# Patient Record
Sex: MALE | Race: WHITE | NOT HISPANIC OR LATINO
[De-identification: names, ages, dates, MRNs, and addresses within clinical notes are randomized per-mention and may not be internally consistent; named-entity substitution may affect disease eponyms.]

---

## 2017-01-09 ENCOUNTER — APPOINTMENT (OUTPATIENT)
Dept: ORTHOPEDIC SURGERY | Facility: CLINIC | Age: 62
End: 2017-01-09

## 2017-01-09 VITALS — HEIGHT: 69 IN | BODY MASS INDEX: 31.1 KG/M2 | RESPIRATION RATE: 16 BRPM | WEIGHT: 210 LBS

## 2017-01-09 DIAGNOSIS — Z87.891 PERSONAL HISTORY OF NICOTINE DEPENDENCE: ICD-10-CM

## 2017-01-09 DIAGNOSIS — G20 PARKINSON'S DISEASE: ICD-10-CM

## 2017-01-09 DIAGNOSIS — S63.639D SPRAIN OF INTERPHALANGEAL JOINT OF UNSPECIFIED FINGER, SUBSEQUENT ENCOUNTER: ICD-10-CM

## 2017-01-09 PROBLEM — Z00.00 ENCOUNTER FOR PREVENTIVE HEALTH EXAMINATION: Status: ACTIVE | Noted: 2017-01-09

## 2017-01-09 RX ORDER — CARBIDOPA AND LEVODOPA 25; 250 MG/1; MG/1
25-250 TABLET ORAL
Refills: 0 | Status: ACTIVE | COMMUNITY

## 2017-01-09 RX ORDER — MIRTAZAPINE 15 MG/1
15 TABLET, FILM COATED ORAL
Refills: 0 | Status: ACTIVE | COMMUNITY

## 2017-01-09 RX ORDER — VENLAFAXINE HYDROCHLORIDE 150 MG/1
150 CAPSULE, EXTENDED RELEASE ORAL
Refills: 0 | Status: ACTIVE | COMMUNITY

## 2017-01-18 ENCOUNTER — OTHER (OUTPATIENT)
Age: 62
End: 2017-01-18

## 2017-01-18 RX ORDER — CEPHALEXIN 500 MG/1
500 CAPSULE ORAL EVERY 6 HOURS
Qty: 25 | Refills: 0 | Status: DISCONTINUED | COMMUNITY
Start: 2017-01-18 | End: 2017-01-18

## 2017-01-19 ENCOUNTER — OUTPATIENT (OUTPATIENT)
Dept: OUTPATIENT SERVICES | Facility: HOSPITAL | Age: 62
LOS: 1 days | Discharge: HOME | End: 2017-01-19
Payer: MEDICARE

## 2017-01-19 ENCOUNTER — APPOINTMENT (OUTPATIENT)
Dept: ORTHOPEDIC SURGERY | Facility: AMBULATORY SURGERY CENTER | Age: 62
End: 2017-01-19
Payer: MEDICARE

## 2017-01-19 PROCEDURE — 26615 TREAT METACARPAL FRACTURE: CPT | Mod: F9

## 2017-01-25 DIAGNOSIS — S62.356A NONDISPLACED FRACTURE OF SHAFT OF FIFTH METACARPAL BONE, RIGHT HAND, INITIAL ENCOUNTER FOR CLOSED FRACTURE: ICD-10-CM

## 2017-01-25 DIAGNOSIS — K21.9 GASTRO-ESOPHAGEAL REFLUX DISEASE WITHOUT ESOPHAGITIS: ICD-10-CM
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
## 2017-01-25 DIAGNOSIS — G20 PARKINSON'S DISEASE: ICD-10-CM

## 2017-01-27 ENCOUNTER — APPOINTMENT (OUTPATIENT)
Dept: ORTHOPEDIC SURGERY | Facility: CLINIC | Age: 62
End: 2017-01-27

## 2017-02-10 ENCOUNTER — APPOINTMENT (OUTPATIENT)
Dept: ORTHOPEDIC SURGERY | Facility: CLINIC | Age: 62
End: 2017-02-10

## 2017-02-10 RX ORDER — SELEGILINE HYDROCHLORIDE 5 MG/1
5 CAPSULE ORAL
Qty: 180 | Refills: 0 | Status: ACTIVE | COMMUNITY
Start: 2016-08-16

## 2017-02-10 RX ORDER — AMANTADINE HYDROCHLORIDE 100 1/1
100 TABLET ORAL
Qty: 90 | Refills: 0 | Status: ACTIVE | COMMUNITY
Start: 2017-01-20

## 2017-03-01 ENCOUNTER — APPOINTMENT (OUTPATIENT)
Dept: ORTHOPEDIC SURGERY | Facility: CLINIC | Age: 62
End: 2017-03-01

## 2017-03-01 DIAGNOSIS — S62.326K: ICD-10-CM

## 2019-02-04 ENCOUNTER — APPOINTMENT (OUTPATIENT)
Dept: ORTHOPEDIC SURGERY | Facility: CLINIC | Age: 64
End: 2019-02-04
Payer: MEDICARE

## 2019-02-04 VITALS — RESPIRATION RATE: 16 BRPM | WEIGHT: 210 LBS | HEIGHT: 69 IN | BODY MASS INDEX: 31.1 KG/M2

## 2019-02-04 DIAGNOSIS — M79.645 PAIN IN LEFT FINGER(S): ICD-10-CM

## 2019-02-04 DIAGNOSIS — M79.641 PAIN IN RIGHT HAND: ICD-10-CM

## 2019-02-04 PROCEDURE — 99214 OFFICE O/P EST MOD 30 MIN: CPT

## 2019-02-04 PROCEDURE — 73140 X-RAY EXAM OF FINGER(S): CPT | Mod: F4

## 2019-02-05 RX ORDER — OXYCODONE AND ACETAMINOPHEN 5; 325 MG/1; MG/1
5-325 TABLET ORAL
Qty: 40 | Refills: 0 | Status: DISCONTINUED | COMMUNITY
Start: 2017-01-18 | End: 2019-02-05

## 2019-02-05 RX ORDER — DONEPEZIL HYDROCHLORIDE 5 MG/1
5 TABLET ORAL
Qty: 90 | Refills: 0 | Status: ACTIVE | COMMUNITY
Start: 2019-01-25

## 2019-02-05 RX ORDER — CEPHALEXIN 500 MG/1
500 CAPSULE ORAL
Qty: 20 | Refills: 0 | Status: DISCONTINUED | COMMUNITY
Start: 2017-01-18 | End: 2019-02-05

## 2019-02-05 RX ORDER — OXYCODONE AND ACETAMINOPHEN 5; 325 MG/1; MG/1
5-325 TABLET ORAL
Qty: 30 | Refills: 0 | Status: ACTIVE | COMMUNITY
Start: 2019-02-05 | End: 1900-01-01

## 2019-02-06 ENCOUNTER — APPOINTMENT (OUTPATIENT)
Dept: ORTHOPEDIC SURGERY | Facility: AMBULATORY SURGERY CENTER | Age: 64
End: 2019-02-06
Payer: MEDICARE

## 2019-02-06 ENCOUNTER — OUTPATIENT (OUTPATIENT)
Dept: OUTPATIENT SERVICES | Facility: HOSPITAL | Age: 64
LOS: 1 days | Discharge: HOME | End: 2019-02-06

## 2019-02-06 PROCEDURE — 26727 TREAT FINGER FRACTURE EACH: CPT | Mod: F4

## 2019-02-15 ENCOUNTER — APPOINTMENT (OUTPATIENT)
Dept: ORTHOPEDIC SURGERY | Facility: CLINIC | Age: 64
End: 2019-02-15
Payer: MEDICARE

## 2019-02-15 PROCEDURE — 29075 APPL CST ELBW FNGR SHORT ARM: CPT | Mod: 58,LT

## 2019-02-15 PROCEDURE — 73140 X-RAY EXAM OF FINGER(S): CPT | Mod: F4

## 2019-02-15 PROCEDURE — 99024 POSTOP FOLLOW-UP VISIT: CPT

## 2019-02-15 RX ORDER — CEPHALEXIN 500 MG/1
500 CAPSULE ORAL 3 TIMES DAILY
Qty: 21 | Refills: 0 | Status: DISCONTINUED | COMMUNITY
Start: 2019-02-05 | End: 2019-02-15

## 2019-02-15 NOTE — HISTORY OF PRESENT ILLNESS
[___ Days Post Op] : post op day #[unfilled] [0] : no pain reported [Clean/Dry/Intact] : clean, dry and intact [Healed] : healed [Neuro Intact] : an unremarkable neurological exam [Vascular Intact] : ~T peripheral vascular exam normal [Doing Well] : is doing well [Excellent Pain Control] : has excellent pain control [No Sign of Infection] : is showing no signs of infection [Chills] : no chills [Constipation] : no constipation [Diarrhea] : no diarrhea [Dysuria] : no dysuria [Fever] : no fever [Nausea] : no nausea [Vomiting] : no vomiting [Erythema] : not erythematous [Discharge] : absent of discharge [Swelling] : not swollen [Dehiscence] : not dehisced [Sutures Removed] : sutures were not removed [Steri-Strips Removed & Replaced] : steri-strips not removed [de-identified] : DOS: 2/6/2019 [de-identified] : 9 days s/p CRIF Left V proximal phalanx fracture\par \par Patient got the dressings wet.\par \par Patient is tolerating the antibiotics well without side effects. [de-identified] : The pins are clean and dry with there is mild erythema around both pin sites. There is maintaining alignment and he is moving the digits well with good capillary refill negative Tinel's sensation is intact. [de-identified] : PA lateral oblique of the left fifth digit shows maintained alignment [de-identified] : 9 days s/p CRIF Left V proximal phalanx fracture with pin site irritation and possible early infection. [de-identified] : A short-arm cast was applied.\par \par The importance of compliance and not getting the cast wet with stress.\par \par Return to the office in one week weight-bearing as tolerated

## 2019-02-22 ENCOUNTER — APPOINTMENT (OUTPATIENT)
Dept: ORTHOPEDIC SURGERY | Facility: CLINIC | Age: 64
End: 2019-02-22
Payer: MEDICARE

## 2019-02-22 PROCEDURE — 29075 APPL CST ELBW FNGR SHORT ARM: CPT | Mod: 58,LT

## 2019-02-22 PROCEDURE — 73140 X-RAY EXAM OF FINGER(S): CPT | Mod: F4

## 2019-02-22 PROCEDURE — 99024 POSTOP FOLLOW-UP VISIT: CPT

## 2019-02-22 NOTE — HISTORY OF PRESENT ILLNESS
[___ Days Post Op] : post op day #[unfilled] [0] : no pain reported [Clean/Dry/Intact] : clean, dry and intact [Healed] : healed [Neuro Intact] : an unremarkable neurological exam [Vascular Intact] : ~T peripheral vascular exam normal [Doing Well] : is doing well [Excellent Pain Control] : has excellent pain control [No Sign of Infection] : is showing no signs of infection [Chills] : no chills [Constipation] : no constipation [Diarrhea] : no diarrhea [Dysuria] : no dysuria [Fever] : no fever [Nausea] : no nausea [Vomiting] : no vomiting [Erythema] : not erythematous [Discharge] : absent of discharge [Swelling] : not swollen [Dehiscence] : not dehisced [Sutures Removed] : sutures were not removed [Steri-Strips Removed & Replaced] : steri-strips not removed [de-identified] : DOS: 2/6/19 [de-identified] : 16 days s/p CRIF Left V proximal phalanx fracture\par \par Pt on Keflex\par \par  [de-identified] : Pin sites are clean and dry without evidence of infection. There is excellent little rotation of his wound the digits well. Good capillary refill negative Tinel's sensation grossly intact\par  [de-identified] : PA lateral and oblique of the left hand shows maintained alignment with hardware in place [de-identified] : 16 days s/p CRIF Left V proximal phalanx fracture [de-identified] : Patient will finish off his antibiotics and continue his home program\par \par Return to the office in 1-1/2 weeks

## 2019-03-05 ENCOUNTER — APPOINTMENT (OUTPATIENT)
Dept: ORTHOPEDIC SURGERY | Facility: CLINIC | Age: 64
End: 2019-03-05
Payer: MEDICARE

## 2019-03-05 PROCEDURE — 73140 X-RAY EXAM OF FINGER(S): CPT | Mod: F4

## 2019-03-05 PROCEDURE — 20670 REMOVAL IMPLANT SUPERFICIAL: CPT | Mod: 58,LT

## 2019-03-05 PROCEDURE — 99024 POSTOP FOLLOW-UP VISIT: CPT

## 2019-03-05 RX ORDER — CEPHALEXIN 500 MG/1
500 CAPSULE ORAL EVERY 6 HOURS
Qty: 40 | Refills: 0 | Status: ACTIVE | COMMUNITY
Start: 2019-02-15 | End: 1900-01-01

## 2019-03-11 ENCOUNTER — APPOINTMENT (OUTPATIENT)
Dept: ORTHOPEDIC SURGERY | Facility: CLINIC | Age: 64
End: 2019-03-11
Payer: MEDICARE

## 2019-03-11 PROCEDURE — 73140 X-RAY EXAM OF FINGER(S): CPT | Mod: F4

## 2019-03-11 PROCEDURE — 99024 POSTOP FOLLOW-UP VISIT: CPT

## 2019-03-11 NOTE — HISTORY OF PRESENT ILLNESS
[___ Weeks Post Op] : [unfilled] weeks post op [0] : no pain reported [Clean/Dry/Intact] : clean, dry and intact [Healed] : healed [Neuro Intact] : an unremarkable neurological exam [Vascular Intact] : ~T peripheral vascular exam normal [Doing Well] : is doing well [Excellent Pain Control] : has excellent pain control [No Sign of Infection] : is showing no signs of infection [Chills] : no chills [Constipation] : no constipation [Diarrhea] : no diarrhea [Dysuria] : no dysuria [Fever] : no fever [Nausea] : no nausea [Vomiting] : no vomiting [Erythema] : not erythematous [Discharge] : absent of discharge [Swelling] : not swollen [Dehiscence] : not dehisced [Sutures Removed] : sutures were not removed [Steri-Strips Removed & Replaced] : steri-strips not removed [de-identified] : DOS: 2/6/19 [de-identified] : 5 weeks s/p CRIF Left V proximal phalanx fracture with pin site infection\par \par Pt on Keflex [de-identified] : There is rheumatic reduction of swelling no tenderness to fracture site. The pins sites have re re epithelialize without evidence of infection.\par \par Range of motion is 0/90 of the MP joint 30/80 the PIP joint passively correctable to 0° and 0/60 the DIP joint\par There is no tenderness at the fracture site with good rotation and no instability of the MP PIP and DIP joints bilaterally\par There is good capillary refill of the digits bilaterally.There is no masses or sensitivity over the median and ulnar nerves at the level of the wrist. There is a negative Tinel's and negative Phalen's sign bilaterally. The sensation is grossly intact bilaterally. [de-identified] : 5 weeks s/p CRIF Left V proximal phalanx fracture with pin site infection [de-identified] : Patient will finish his oral antibiotics and begin occupational therapy\par \par Return to the office in one month

## 2019-07-09 ENCOUNTER — APPOINTMENT (OUTPATIENT)
Dept: ORTHOPEDIC SURGERY | Facility: CLINIC | Age: 64
End: 2019-07-09
Payer: MEDICARE

## 2019-07-09 DIAGNOSIS — S62.617P DISPLACED FRACTURE OF PROXIMAL PHALANX OF LEFT LITTLE FINGER, SUBSEQUENT ENCOUNTER FOR FRACTURE WITH MALUNION: ICD-10-CM

## 2019-07-09 PROCEDURE — 73140 X-RAY EXAM OF FINGER(S): CPT | Mod: F4

## 2019-07-09 PROCEDURE — 99214 OFFICE O/P EST MOD 30 MIN: CPT

## 2024-01-01 ENCOUNTER — INPATIENT (INPATIENT)
Facility: HOSPITAL | Age: 69
LOS: 2 days | DRG: 951 | End: 2024-01-27
Attending: INTERNAL MEDICINE | Admitting: INTERNAL MEDICINE
Payer: OTHER MISCELLANEOUS

## 2024-01-01 ENCOUNTER — TRANSCRIPTION ENCOUNTER (OUTPATIENT)
Age: 69
End: 2024-01-01

## 2024-01-01 ENCOUNTER — INPATIENT (INPATIENT)
Facility: HOSPITAL | Age: 69
LOS: 0 days | Discharge: HOSPICE | DRG: 871 | End: 2024-01-24
Attending: STUDENT IN AN ORGANIZED HEALTH CARE EDUCATION/TRAINING PROGRAM | Admitting: INTERNAL MEDICINE
Payer: MEDICARE

## 2024-01-01 VITALS
HEART RATE: 113 BPM | DIASTOLIC BLOOD PRESSURE: 53 MMHG | SYSTOLIC BLOOD PRESSURE: 85 MMHG | OXYGEN SATURATION: 86 % | RESPIRATION RATE: 30 BRPM

## 2024-01-01 VITALS
DIASTOLIC BLOOD PRESSURE: 54 MMHG | OXYGEN SATURATION: 86 % | TEMPERATURE: 99 F | SYSTOLIC BLOOD PRESSURE: 90 MMHG | HEART RATE: 100 BPM

## 2024-01-01 VITALS
TEMPERATURE: 102 F | RESPIRATION RATE: 8 BRPM | OXYGEN SATURATION: 53 % | SYSTOLIC BLOOD PRESSURE: 52 MMHG | DIASTOLIC BLOOD PRESSURE: 30 MMHG | HEART RATE: 36 BPM

## 2024-01-01 VITALS
OXYGEN SATURATION: 84 % | DIASTOLIC BLOOD PRESSURE: 53 MMHG | SYSTOLIC BLOOD PRESSURE: 82 MMHG | HEART RATE: 92 BPM | RESPIRATION RATE: 19 BRPM | TEMPERATURE: 100 F

## 2024-01-01 DIAGNOSIS — G20.A1 PARKINSON'S DISEASE WITHOUT DYSKINESIA, WITHOUT MENTION OF FLUCTUATIONS: ICD-10-CM

## 2024-01-01 DIAGNOSIS — Z51.5 ENCOUNTER FOR PALLIATIVE CARE: ICD-10-CM

## 2024-01-01 DIAGNOSIS — J96.01 ACUTE RESPIRATORY FAILURE WITH HYPOXIA: ICD-10-CM

## 2024-01-01 DIAGNOSIS — T83.9XXA UNSPECIFIED COMPLICATION OF GENITOURINARY PROSTHETIC DEVICE, IMPLANT AND GRAFT, INITIAL ENCOUNTER: ICD-10-CM

## 2024-01-01 DIAGNOSIS — R53.2 FUNCTIONAL QUADRIPLEGIA: ICD-10-CM

## 2024-01-01 DIAGNOSIS — Z71.89 OTHER SPECIFIED COUNSELING: ICD-10-CM

## 2024-01-01 DIAGNOSIS — R06.00 DYSPNEA, UNSPECIFIED: ICD-10-CM

## 2024-01-01 DIAGNOSIS — Z29.9 ENCOUNTER FOR PROPHYLACTIC MEASURES, UNSPECIFIED: ICD-10-CM

## 2024-01-01 DIAGNOSIS — U07.1 COVID-19: ICD-10-CM

## 2024-01-01 DIAGNOSIS — F03.90 UNSPECIFIED DEMENTIA WITHOUT BEHAVIORAL DISTURBANCE: ICD-10-CM

## 2024-01-01 LAB
-  AMIKACIN: SIGNIFICANT CHANGE UP
-  AMPICILLIN/SULBACTAM: SIGNIFICANT CHANGE UP
-  AMPICILLIN: SIGNIFICANT CHANGE UP
-  CEFAZOLIN: SIGNIFICANT CHANGE UP
-  CEFEPIME: SIGNIFICANT CHANGE UP
-  CEFTRIAXONE: SIGNIFICANT CHANGE UP
-  CIPROFLOXACIN: SIGNIFICANT CHANGE UP
-  CLINDAMYCIN: SIGNIFICANT CHANGE UP
-  CLINDAMYCIN: SIGNIFICANT CHANGE UP
-  COAGULASE NEGATIVE STAPHYLOCOCCUS: SIGNIFICANT CHANGE UP
-  ERTAPENEM: SIGNIFICANT CHANGE UP
-  ERYTHROMYCIN: SIGNIFICANT CHANGE UP
-  ERYTHROMYCIN: SIGNIFICANT CHANGE UP
-  GENTAMICIN: SIGNIFICANT CHANGE UP
-  LINEZOLID: SIGNIFICANT CHANGE UP
-  LINEZOLID: SIGNIFICANT CHANGE UP
-  MEROPENEM: SIGNIFICANT CHANGE UP
-  NITROFURANTOIN: SIGNIFICANT CHANGE UP
-  OXACILLIN: SIGNIFICANT CHANGE UP
-  OXACILLIN: SIGNIFICANT CHANGE UP
-  PIPERACILLIN/TAZOBACTAM: SIGNIFICANT CHANGE UP
-  RIFAMPIN: SIGNIFICANT CHANGE UP
-  RIFAMPIN: SIGNIFICANT CHANGE UP
-  TOBRAMYCIN: SIGNIFICANT CHANGE UP
-  TRIMETHOPRIM/SULFAMETHOXAZOLE: SIGNIFICANT CHANGE UP
-  VANCOMYCIN: SIGNIFICANT CHANGE UP
-  VANCOMYCIN: SIGNIFICANT CHANGE UP
ALBUMIN SERPL ELPH-MCNC: 2.2 G/DL — LOW (ref 3.3–5)
ALBUMIN SERPL ELPH-MCNC: 2.8 G/DL — LOW (ref 3.3–5)
ALP SERPL-CCNC: 176 U/L — HIGH (ref 40–120)
ALP SERPL-CCNC: 242 U/L — HIGH (ref 40–120)
ALT FLD-CCNC: 52 U/L — HIGH (ref 10–45)
ALT FLD-CCNC: 76 U/L — HIGH (ref 10–45)
ANION GAP SERPL CALC-SCNC: 17 MMOL/L — SIGNIFICANT CHANGE UP (ref 5–17)
ANION GAP SERPL CALC-SCNC: 7 MMOL/L — SIGNIFICANT CHANGE UP (ref 5–17)
ANISOCYTOSIS BLD QL: SLIGHT — SIGNIFICANT CHANGE UP
ANISOCYTOSIS BLD QL: SLIGHT — SIGNIFICANT CHANGE UP
APPEARANCE UR: ABNORMAL
APTT BLD: 29.1 SEC — SIGNIFICANT CHANGE UP (ref 24.5–35.6)
AST SERPL-CCNC: 53 U/L — HIGH (ref 10–40)
AST SERPL-CCNC: 94 U/L — HIGH (ref 10–40)
BACTERIA # UR AUTO: ABNORMAL /HPF
BASE EXCESS BLDA CALC-SCNC: -2.6 MMOL/L — LOW (ref -2–3)
BASE EXCESS BLDV CALC-SCNC: -2.8 MMOL/L — LOW (ref -2–3)
BASOPHILS # BLD AUTO: 0 K/UL — SIGNIFICANT CHANGE UP (ref 0–0.2)
BASOPHILS # BLD AUTO: 0.12 K/UL — SIGNIFICANT CHANGE UP (ref 0–0.2)
BASOPHILS NFR BLD AUTO: 0 % — SIGNIFICANT CHANGE UP (ref 0–2)
BASOPHILS NFR BLD AUTO: 0.9 % — SIGNIFICANT CHANGE UP (ref 0–2)
BILIRUB SERPL-MCNC: 1.3 MG/DL — HIGH (ref 0.2–1.2)
BILIRUB SERPL-MCNC: 2 MG/DL — HIGH (ref 0.2–1.2)
BILIRUB UR-MCNC: ABNORMAL
BLD GP AB SCN SERPL QL: NEGATIVE — SIGNIFICANT CHANGE UP
BUN SERPL-MCNC: 29 MG/DL — HIGH (ref 7–23)
BUN SERPL-MCNC: 35 MG/DL — HIGH (ref 7–23)
BURR CELLS BLD QL SMEAR: PRESENT — SIGNIFICANT CHANGE UP
BURR CELLS BLD QL SMEAR: SLIGHT — SIGNIFICANT CHANGE UP
CA-I SERPL-SCNC: 1.21 MMOL/L — SIGNIFICANT CHANGE UP (ref 1.15–1.33)
CALCIUM SERPL-MCNC: 8.2 MG/DL — LOW (ref 8.4–10.5)
CALCIUM SERPL-MCNC: 9.6 MG/DL — SIGNIFICANT CHANGE UP (ref 8.4–10.5)
CAST: 13 /LPF — HIGH (ref 0–4)
CHLORIDE SERPL-SCNC: 118 MMOL/L — HIGH (ref 96–108)
CHLORIDE SERPL-SCNC: 119 MMOL/L — HIGH (ref 96–108)
CO2 BLDA-SCNC: 21 MMOL/L — SIGNIFICANT CHANGE UP (ref 19–24)
CO2 BLDV-SCNC: 26.8 MMOL/L — HIGH (ref 22–26)
CO2 SERPL-SCNC: 21 MMOL/L — LOW (ref 22–31)
CO2 SERPL-SCNC: 25 MMOL/L — SIGNIFICANT CHANGE UP (ref 22–31)
COARSE GRAN CASTS #/AREA URNS AUTO: PRESENT
COD CRY URNS QL: PRESENT
COLOR SPEC: SIGNIFICANT CHANGE UP
CREAT ?TM UR-MCNC: 50 MG/DL — SIGNIFICANT CHANGE UP
CREAT SERPL-MCNC: 0.65 MG/DL — SIGNIFICANT CHANGE UP (ref 0.5–1.3)
CREAT SERPL-MCNC: 0.91 MG/DL — SIGNIFICANT CHANGE UP (ref 0.5–1.3)
CULTURE RESULTS: ABNORMAL
DACRYOCYTES BLD QL SMEAR: SLIGHT — SIGNIFICANT CHANGE UP
DIFF PNL FLD: ABNORMAL
EGFR: 102 ML/MIN/1.73M2 — SIGNIFICANT CHANGE UP
EGFR: 91 ML/MIN/1.73M2 — SIGNIFICANT CHANGE UP
EOSINOPHIL # BLD AUTO: 0 K/UL — SIGNIFICANT CHANGE UP (ref 0–0.5)
EOSINOPHIL # BLD AUTO: 0 K/UL — SIGNIFICANT CHANGE UP (ref 0–0.5)
EOSINOPHIL NFR BLD AUTO: 0 % — SIGNIFICANT CHANGE UP (ref 0–6)
EOSINOPHIL NFR BLD AUTO: 0 % — SIGNIFICANT CHANGE UP (ref 0–6)
FLUAV AG NPH QL: SIGNIFICANT CHANGE UP
FLUBV AG NPH QL: SIGNIFICANT CHANGE UP
GAS PNL BLDA: SIGNIFICANT CHANGE UP
GAS PNL BLDA: SIGNIFICANT CHANGE UP
GAS PNL BLDV: 155 MMOL/L — HIGH (ref 136–145)
GAS PNL BLDV: SIGNIFICANT CHANGE UP
GAS PNL BLDV: SIGNIFICANT CHANGE UP
GIANT PLATELETS BLD QL SMEAR: PRESENT — SIGNIFICANT CHANGE UP
GLUCOSE BLDC GLUCOMTR-MCNC: 137 MG/DL — HIGH (ref 70–99)
GLUCOSE SERPL-MCNC: 130 MG/DL — HIGH (ref 70–99)
GLUCOSE SERPL-MCNC: 162 MG/DL — HIGH (ref 70–99)
GLUCOSE UR QL: NEGATIVE MG/DL — SIGNIFICANT CHANGE UP
GRAM STN FLD: ABNORMAL
GRAM STN FLD: ABNORMAL
HCO3 BLDA-SCNC: 20 MMOL/L — LOW (ref 21–28)
HCO3 BLDV-SCNC: 25 MMOL/L — SIGNIFICANT CHANGE UP (ref 22–29)
HCT VFR BLD CALC: 35.9 % — LOW (ref 39–50)
HCT VFR BLD CALC: 42.2 % — SIGNIFICANT CHANGE UP (ref 39–50)
HCV AB S/CO SERPL IA: 0.03 S/CO — SIGNIFICANT CHANGE UP
HCV AB SERPL-IMP: SIGNIFICANT CHANGE UP
HGB BLD-MCNC: 10.5 G/DL — LOW (ref 13–17)
HGB BLD-MCNC: 12.6 G/DL — LOW (ref 13–17)
INR BLD: 1.38 — HIGH (ref 0.85–1.18)
KETONES UR-MCNC: NEGATIVE MG/DL — SIGNIFICANT CHANGE UP
LACTATE SERPL-SCNC: 1.6 MMOL/L — SIGNIFICANT CHANGE UP (ref 0.5–2)
LACTATE SERPL-SCNC: 4.1 MMOL/L — CRITICAL HIGH (ref 0.5–2)
LACTATE SERPL-SCNC: 7.1 MMOL/L — CRITICAL HIGH (ref 0.5–2)
LEUKOCYTE ESTERASE UR-ACNC: ABNORMAL
LYMPHOCYTES # BLD AUTO: 0.95 K/UL — LOW (ref 1–3.3)
LYMPHOCYTES # BLD AUTO: 16.5 % — SIGNIFICANT CHANGE UP (ref 13–44)
LYMPHOCYTES # BLD AUTO: 2.27 K/UL — SIGNIFICANT CHANGE UP (ref 1–3.3)
LYMPHOCYTES # BLD AUTO: 5.2 % — LOW (ref 13–44)
MACROCYTES BLD QL: SLIGHT — SIGNIFICANT CHANGE UP
MAGNESIUM SERPL-MCNC: 2 MG/DL — SIGNIFICANT CHANGE UP (ref 1.6–2.6)
MANUAL SMEAR VERIFICATION: SIGNIFICANT CHANGE UP
MANUAL SMEAR VERIFICATION: SIGNIFICANT CHANGE UP
MCHC RBC-ENTMCNC: 27.5 PG — SIGNIFICANT CHANGE UP (ref 27–34)
MCHC RBC-ENTMCNC: 27.6 PG — SIGNIFICANT CHANGE UP (ref 27–34)
MCHC RBC-ENTMCNC: 29.2 GM/DL — LOW (ref 32–36)
MCHC RBC-ENTMCNC: 29.9 GM/DL — LOW (ref 32–36)
MCV RBC AUTO: 91.9 FL — SIGNIFICANT CHANGE UP (ref 80–100)
MCV RBC AUTO: 94.5 FL — SIGNIFICANT CHANGE UP (ref 80–100)
METHOD TYPE: SIGNIFICANT CHANGE UP
MICROCYTES BLD QL: SLIGHT — SIGNIFICANT CHANGE UP
MONOCYTES # BLD AUTO: 0.12 K/UL — SIGNIFICANT CHANGE UP (ref 0–0.9)
MONOCYTES # BLD AUTO: 0.48 K/UL — SIGNIFICANT CHANGE UP (ref 0–0.9)
MONOCYTES NFR BLD AUTO: 0.9 % — LOW (ref 2–14)
MONOCYTES NFR BLD AUTO: 2.6 % — SIGNIFICANT CHANGE UP (ref 2–14)
NEUTROPHILS # BLD AUTO: 10.64 K/UL — HIGH (ref 1.8–7.4)
NEUTROPHILS # BLD AUTO: 16.89 K/UL — HIGH (ref 1.8–7.4)
NEUTROPHILS NFR BLD AUTO: 73 % — SIGNIFICANT CHANGE UP (ref 43–77)
NEUTROPHILS NFR BLD AUTO: 90.5 % — HIGH (ref 43–77)
NEUTS BAND # BLD: 1.7 % — SIGNIFICANT CHANGE UP (ref 0–8)
NEUTS BAND # BLD: 4.4 % — SIGNIFICANT CHANGE UP (ref 0–8)
NITRITE UR-MCNC: POSITIVE
NT-PROBNP SERPL-SCNC: 1263 PG/ML — HIGH (ref 0–300)
ORGANISM # SPEC MICROSCOPIC CNT: ABNORMAL
ORGANISM # SPEC MICROSCOPIC CNT: ABNORMAL
ORGANISM # SPEC MICROSCOPIC CNT: SIGNIFICANT CHANGE UP
OSMOLALITY UR: 545 MOSM/KG — SIGNIFICANT CHANGE UP (ref 300–900)
OVALOCYTES BLD QL SMEAR: SLIGHT — SIGNIFICANT CHANGE UP
OVALOCYTES BLD QL SMEAR: SLIGHT — SIGNIFICANT CHANGE UP
PCO2 BLDA: 28 MMHG — LOW (ref 35–48)
PCO2 BLDV: 56 MMHG — HIGH (ref 42–55)
PH BLDA: 7.46 — HIGH (ref 7.35–7.45)
PH BLDV: 7.26 — LOW (ref 7.32–7.43)
PH UR: 5.5 — SIGNIFICANT CHANGE UP (ref 5–8)
PHOSPHATE SERPL-MCNC: 3.9 MG/DL — SIGNIFICANT CHANGE UP (ref 2.5–4.5)
PLAT MORPH BLD: NORMAL — SIGNIFICANT CHANGE UP
PLAT MORPH BLD: NORMAL — SIGNIFICANT CHANGE UP
PLATELET # BLD AUTO: 454 K/UL — HIGH (ref 150–400)
PLATELET # BLD AUTO: 476 K/UL — HIGH (ref 150–400)
PO2 BLDA: 54 MMHG — LOW (ref 83–108)
PO2 BLDV: <33 MMHG — SIGNIFICANT CHANGE UP (ref 25–45)
POIKILOCYTOSIS BLD QL AUTO: SLIGHT — SIGNIFICANT CHANGE UP
POIKILOCYTOSIS BLD QL AUTO: SLIGHT — SIGNIFICANT CHANGE UP
POTASSIUM BLDV-SCNC: 5.2 MMOL/L — HIGH (ref 3.5–5.1)
POTASSIUM SERPL-MCNC: 3.9 MMOL/L — SIGNIFICANT CHANGE UP (ref 3.5–5.3)
POTASSIUM SERPL-MCNC: 4.5 MMOL/L — SIGNIFICANT CHANGE UP (ref 3.5–5.3)
POTASSIUM SERPL-SCNC: 3.9 MMOL/L — SIGNIFICANT CHANGE UP (ref 3.5–5.3)
POTASSIUM SERPL-SCNC: 4.5 MMOL/L — SIGNIFICANT CHANGE UP (ref 3.5–5.3)
PROCALCITONIN SERPL-MCNC: 1.86 NG/ML — HIGH (ref 0.02–0.1)
PROT SERPL-MCNC: 6.4 G/DL — SIGNIFICANT CHANGE UP (ref 6–8.3)
PROT SERPL-MCNC: 7.4 G/DL — SIGNIFICANT CHANGE UP (ref 6–8.3)
PROT UR-MCNC: 100 MG/DL
PROTHROM AB SERPL-ACNC: 15.6 SEC — HIGH (ref 9.5–13)
RAPID RVP RESULT: DETECTED
RBC # BLD: 3.8 M/UL — LOW (ref 4.2–5.8)
RBC # BLD: 4.59 M/UL — SIGNIFICANT CHANGE UP (ref 4.2–5.8)
RBC # FLD: 15.4 % — HIGH (ref 10.3–14.5)
RBC # FLD: 15.6 % — HIGH (ref 10.3–14.5)
RBC BLD AUTO: ABNORMAL
RBC BLD AUTO: NORMAL — SIGNIFICANT CHANGE UP
RBC CASTS # UR COMP ASSIST: 46 /HPF — HIGH (ref 0–4)
RH IG SCN BLD-IMP: POSITIVE — SIGNIFICANT CHANGE UP
RSV RNA NPH QL NAA+NON-PROBE: SIGNIFICANT CHANGE UP
SAO2 % BLDA: 86.9 % — LOW (ref 94–98)
SAO2 % BLDV: 17.2 % — LOW (ref 67–88)
SARS-COV-2 RNA SPEC QL NAA+PROBE: DETECTED
SARS-COV-2 RNA SPEC QL NAA+PROBE: DETECTED
SODIUM SERPL-SCNC: 151 MMOL/L — HIGH (ref 135–145)
SODIUM SERPL-SCNC: 156 MMOL/L — HIGH (ref 135–145)
SODIUM UR-SCNC: 20 MMOL/L — SIGNIFICANT CHANGE UP
SP GR SPEC: >1.03 — HIGH (ref 1–1.03)
SPECIMEN SOURCE: SIGNIFICANT CHANGE UP
SPHEROCYTES BLD QL SMEAR: SLIGHT — SIGNIFICANT CHANGE UP
SQUAMOUS # UR AUTO: 2 /HPF — SIGNIFICANT CHANGE UP (ref 0–5)
TROPONIN T, HIGH SENSITIVITY RESULT: 99 NG/L — CRITICAL HIGH (ref 0–51)
UROBILINOGEN FLD QL: 1 MG/DL — SIGNIFICANT CHANGE UP (ref 0.2–1)
UUN UR-MCNC: 685 MG/DL — SIGNIFICANT CHANGE UP
VARIANT LYMPHS # BLD: 4.3 % — SIGNIFICANT CHANGE UP (ref 0–6)
WBC # BLD: 13.75 K/UL — HIGH (ref 3.8–10.5)
WBC # BLD: 18.32 K/UL — HIGH (ref 3.8–10.5)
WBC # FLD AUTO: 13.75 K/UL — HIGH (ref 3.8–10.5)
WBC # FLD AUTO: 18.32 K/UL — HIGH (ref 3.8–10.5)
WBC UR QL: 90 /HPF — HIGH (ref 0–5)

## 2024-01-01 PROCEDURE — 82570 ASSAY OF URINE CREATININE: CPT

## 2024-01-01 PROCEDURE — 0225U NFCT DS DNA&RNA 21 SARSCOV2: CPT

## 2024-01-01 PROCEDURE — 99233 SBSQ HOSP IP/OBS HIGH 50: CPT

## 2024-01-01 PROCEDURE — 86901 BLOOD TYPING SEROLOGIC RH(D): CPT

## 2024-01-01 PROCEDURE — 99223 1ST HOSP IP/OBS HIGH 75: CPT | Mod: GW

## 2024-01-01 PROCEDURE — 76937 US GUIDE VASCULAR ACCESS: CPT | Mod: 26,59

## 2024-01-01 PROCEDURE — 71045 X-RAY EXAM CHEST 1 VIEW: CPT | Mod: 26

## 2024-01-01 PROCEDURE — 99497 ADVNCD CARE PLAN 30 MIN: CPT | Mod: 25

## 2024-01-01 PROCEDURE — 99291 CRITICAL CARE FIRST HOUR: CPT | Mod: 25

## 2024-01-01 PROCEDURE — 74174 CTA ABD&PLVS W/CONTRAST: CPT | Mod: MA

## 2024-01-01 PROCEDURE — 36000 PLACE NEEDLE IN VEIN: CPT

## 2024-01-01 PROCEDURE — 99291 CRITICAL CARE FIRST HOUR: CPT

## 2024-01-01 PROCEDURE — 84484 ASSAY OF TROPONIN QUANT: CPT

## 2024-01-01 PROCEDURE — 99233 SBSQ HOSP IP/OBS HIGH 50: CPT | Mod: GW

## 2024-01-01 PROCEDURE — 86803 HEPATITIS C AB TEST: CPT

## 2024-01-01 PROCEDURE — 96374 THER/PROPH/DIAG INJ IV PUSH: CPT

## 2024-01-01 PROCEDURE — 99239 HOSP IP/OBS DSCHRG MGMT >30: CPT

## 2024-01-01 PROCEDURE — 87637 SARSCOV2&INF A&B&RSV AMP PRB: CPT

## 2024-01-01 PROCEDURE — 82330 ASSAY OF CALCIUM: CPT

## 2024-01-01 PROCEDURE — 84132 ASSAY OF SERUM POTASSIUM: CPT

## 2024-01-01 PROCEDURE — 84145 PROCALCITONIN (PCT): CPT

## 2024-01-01 PROCEDURE — 81001 URINALYSIS AUTO W/SCOPE: CPT

## 2024-01-01 PROCEDURE — 80053 COMPREHEN METABOLIC PANEL: CPT

## 2024-01-01 PROCEDURE — 86900 BLOOD TYPING SEROLOGIC ABO: CPT

## 2024-01-01 PROCEDURE — 71275 CT ANGIOGRAPHY CHEST: CPT | Mod: 26,MA

## 2024-01-01 PROCEDURE — 71275 CT ANGIOGRAPHY CHEST: CPT | Mod: MA

## 2024-01-01 PROCEDURE — 74174 CTA ABD&PLVS W/CONTRAST: CPT | Mod: 26,MA

## 2024-01-01 PROCEDURE — 94660 CPAP INITIATION&MGMT: CPT

## 2024-01-01 PROCEDURE — 87150 DNA/RNA AMPLIFIED PROBE: CPT

## 2024-01-01 PROCEDURE — 96368 THER/DIAG CONCURRENT INF: CPT

## 2024-01-01 PROCEDURE — 96376 TX/PRO/DX INJ SAME DRUG ADON: CPT

## 2024-01-01 PROCEDURE — 36415 COLL VENOUS BLD VENIPUNCTURE: CPT

## 2024-01-01 PROCEDURE — 83735 ASSAY OF MAGNESIUM: CPT

## 2024-01-01 PROCEDURE — 85025 COMPLETE CBC W/AUTO DIFF WBC: CPT

## 2024-01-01 PROCEDURE — 36600 WITHDRAWAL OF ARTERIAL BLOOD: CPT

## 2024-01-01 PROCEDURE — 87184 SC STD DISK METHOD PER PLATE: CPT

## 2024-01-01 PROCEDURE — 86850 RBC ANTIBODY SCREEN: CPT

## 2024-01-01 PROCEDURE — 99223 1ST HOSP IP/OBS HIGH 75: CPT

## 2024-01-01 PROCEDURE — 99233 SBSQ HOSP IP/OBS HIGH 50: CPT | Mod: GC

## 2024-01-01 PROCEDURE — 82803 BLOOD GASES ANY COMBINATION: CPT

## 2024-01-01 PROCEDURE — 85730 THROMBOPLASTIN TIME PARTIAL: CPT

## 2024-01-01 PROCEDURE — 83605 ASSAY OF LACTIC ACID: CPT

## 2024-01-01 PROCEDURE — 96375 TX/PRO/DX INJ NEW DRUG ADDON: CPT

## 2024-01-01 PROCEDURE — 83880 ASSAY OF NATRIURETIC PEPTIDE: CPT

## 2024-01-01 PROCEDURE — 85610 PROTHROMBIN TIME: CPT

## 2024-01-01 PROCEDURE — 84540 ASSAY OF URINE/UREA-N: CPT

## 2024-01-01 PROCEDURE — 84295 ASSAY OF SERUM SODIUM: CPT

## 2024-01-01 PROCEDURE — 83935 ASSAY OF URINE OSMOLALITY: CPT

## 2024-01-01 PROCEDURE — 84100 ASSAY OF PHOSPHORUS: CPT

## 2024-01-01 PROCEDURE — 87186 SC STD MICRODIL/AGAR DIL: CPT

## 2024-01-01 PROCEDURE — 71045 X-RAY EXAM CHEST 1 VIEW: CPT

## 2024-01-01 PROCEDURE — 82962 GLUCOSE BLOOD TEST: CPT

## 2024-01-01 PROCEDURE — 87086 URINE CULTURE/COLONY COUNT: CPT

## 2024-01-01 PROCEDURE — 84300 ASSAY OF URINE SODIUM: CPT

## 2024-01-01 PROCEDURE — 76937 US GUIDE VASCULAR ACCESS: CPT | Mod: 26

## 2024-01-01 PROCEDURE — 87040 BLOOD CULTURE FOR BACTERIA: CPT

## 2024-01-01 PROCEDURE — 96365 THER/PROPH/DIAG IV INF INIT: CPT

## 2024-01-01 RX ORDER — MEMANTINE HYDROCHLORIDE 10 MG/1
1 TABLET ORAL
Refills: 0 | DISCHARGE

## 2024-01-01 RX ORDER — ACETAMINOPHEN 500 MG
2 TABLET ORAL
Refills: 0 | DISCHARGE

## 2024-01-01 RX ORDER — CEFEPIME 1 G/1
2000 INJECTION, POWDER, FOR SOLUTION INTRAMUSCULAR; INTRAVENOUS EVERY 8 HOURS
Refills: 0 | Status: COMPLETED | OUTPATIENT
Start: 2024-01-01 | End: 2024-01-01

## 2024-01-01 RX ORDER — HYDROMORPHONE HYDROCHLORIDE 2 MG/ML
1 INJECTION INTRAMUSCULAR; INTRAVENOUS; SUBCUTANEOUS EVERY 4 HOURS
Refills: 0 | Status: DISCONTINUED | OUTPATIENT
Start: 2024-01-01 | End: 2024-01-01

## 2024-01-01 RX ORDER — ROBINUL 0.2 MG/ML
0.4 INJECTION INTRAMUSCULAR; INTRAVENOUS
Refills: 0 | Status: DISCONTINUED | OUTPATIENT
Start: 2024-01-01 | End: 2024-01-01

## 2024-01-01 RX ORDER — POLYETHYLENE GLYCOL 3350 17 G/17G
17 POWDER, FOR SOLUTION ORAL
Refills: 0 | DISCHARGE

## 2024-01-01 RX ORDER — MORPHINE SULFATE 50 MG/1
2 CAPSULE, EXTENDED RELEASE ORAL
Refills: 0 | Status: DISCONTINUED | OUTPATIENT
Start: 2024-01-01 | End: 2024-01-01

## 2024-01-01 RX ORDER — AZITHROMYCIN 500 MG/1
500 TABLET, FILM COATED ORAL ONCE
Refills: 0 | Status: COMPLETED | OUTPATIENT
Start: 2024-01-01 | End: 2024-01-01

## 2024-01-01 RX ORDER — DEXTROSE 50 % IN WATER 50 %
12.5 SYRINGE (ML) INTRAVENOUS ONCE
Refills: 0 | Status: DISCONTINUED | OUTPATIENT
Start: 2024-01-01 | End: 2024-01-01

## 2024-01-01 RX ORDER — ENOXAPARIN SODIUM 100 MG/ML
40 INJECTION SUBCUTANEOUS EVERY 24 HOURS
Refills: 0 | Status: DISCONTINUED | OUTPATIENT
Start: 2024-01-01 | End: 2024-01-01

## 2024-01-01 RX ORDER — ASCORBIC ACID 60 MG
1 TABLET,CHEWABLE ORAL
Refills: 0 | DISCHARGE

## 2024-01-01 RX ORDER — HALOPERIDOL DECANOATE 100 MG/ML
1 INJECTION INTRAMUSCULAR
Refills: 0 | Status: DISCONTINUED | OUTPATIENT
Start: 2024-01-01 | End: 2024-01-01

## 2024-01-01 RX ORDER — ACETAMINOPHEN 500 MG
1000 TABLET ORAL ONCE
Refills: 0 | Status: COMPLETED | OUTPATIENT
Start: 2024-01-01 | End: 2024-01-01

## 2024-01-01 RX ORDER — ACETAMINOPHEN 500 MG
650 TABLET ORAL EVERY 6 HOURS
Refills: 0 | Status: DISCONTINUED | OUTPATIENT
Start: 2024-01-01 | End: 2024-01-01

## 2024-01-01 RX ORDER — SODIUM CHLORIDE 9 MG/ML
1000 INJECTION, SOLUTION INTRAVENOUS
Refills: 0 | Status: DISCONTINUED | OUTPATIENT
Start: 2024-01-01 | End: 2024-01-01

## 2024-01-01 RX ORDER — CEFEPIME 1 G/1
2000 INJECTION, POWDER, FOR SOLUTION INTRAMUSCULAR; INTRAVENOUS ONCE
Refills: 0 | Status: COMPLETED | OUTPATIENT
Start: 2024-01-01 | End: 2024-01-01

## 2024-01-01 RX ORDER — MORPHINE SULFATE 50 MG/1
4 CAPSULE, EXTENDED RELEASE ORAL
Refills: 0 | Status: DISCONTINUED | OUTPATIENT
Start: 2024-01-01 | End: 2024-01-01

## 2024-01-01 RX ORDER — HYDROMORPHONE HYDROCHLORIDE 2 MG/ML
1 INJECTION INTRAMUSCULAR; INTRAVENOUS; SUBCUTANEOUS
Refills: 0 | Status: DISCONTINUED | OUTPATIENT
Start: 2024-01-01 | End: 2024-01-01

## 2024-01-01 RX ORDER — HYDROCORTISONE 20 MG
100 TABLET ORAL ONCE
Refills: 0 | Status: COMPLETED | OUTPATIENT
Start: 2024-01-01 | End: 2024-01-01

## 2024-01-01 RX ORDER — SCOPALAMINE 1 MG/3D
1 PATCH, EXTENDED RELEASE TRANSDERMAL
Refills: 0 | Status: DISCONTINUED | OUTPATIENT
Start: 2024-01-01 | End: 2024-01-01

## 2024-01-01 RX ORDER — ACETAMINOPHEN 500 MG
500 TABLET ORAL ONCE
Refills: 0 | Status: COMPLETED | OUTPATIENT
Start: 2024-01-01 | End: 2024-01-01

## 2024-01-01 RX ORDER — CHLORHEXIDINE GLUCONATE 213 G/1000ML
1 SOLUTION TOPICAL
Refills: 0 | Status: DISCONTINUED | OUTPATIENT
Start: 2024-01-01 | End: 2024-01-01

## 2024-01-01 RX ORDER — MORPHINE SULFATE 50 MG/1
4 CAPSULE, EXTENDED RELEASE ORAL EVERY 8 HOURS
Refills: 0 | Status: DISCONTINUED | OUTPATIENT
Start: 2024-01-01 | End: 2024-01-01

## 2024-01-01 RX ORDER — CEFEPIME 1 G/1
INJECTION, POWDER, FOR SOLUTION INTRAMUSCULAR; INTRAVENOUS
Refills: 0 | Status: COMPLETED | OUTPATIENT
Start: 2024-01-01 | End: 2024-01-01

## 2024-01-01 RX ORDER — PANTOPRAZOLE SODIUM 20 MG/1
40 TABLET, DELAYED RELEASE ORAL EVERY 24 HOURS
Refills: 0 | Status: DISCONTINUED | OUTPATIENT
Start: 2024-01-01 | End: 2024-01-01

## 2024-01-01 RX ORDER — SODIUM CHLORIDE 9 MG/ML
2200 INJECTION INTRAMUSCULAR; INTRAVENOUS; SUBCUTANEOUS ONCE
Refills: 0 | Status: COMPLETED | OUTPATIENT
Start: 2024-01-01 | End: 2024-01-01

## 2024-01-01 RX ORDER — DEXAMETHASONE 0.5 MG/5ML
6 ELIXIR ORAL ONCE
Refills: 0 | Status: COMPLETED | OUTPATIENT
Start: 2024-01-01 | End: 2024-01-01

## 2024-01-01 RX ORDER — MORPHINE SULFATE 50 MG/1
1 CAPSULE, EXTENDED RELEASE ORAL ONCE
Refills: 0 | Status: DISCONTINUED | OUTPATIENT
Start: 2024-01-01 | End: 2024-01-01

## 2024-01-01 RX ORDER — FINASTERIDE 5 MG/1
1 TABLET, FILM COATED ORAL
Refills: 0 | DISCHARGE

## 2024-01-01 RX ORDER — GLUCAGON INJECTION, SOLUTION 0.5 MG/.1ML
1 INJECTION, SOLUTION SUBCUTANEOUS ONCE
Refills: 0 | Status: DISCONTINUED | OUTPATIENT
Start: 2024-01-01 | End: 2024-01-01

## 2024-01-01 RX ORDER — DEXTROSE 50 % IN WATER 50 %
25 SYRINGE (ML) INTRAVENOUS ONCE
Refills: 0 | Status: DISCONTINUED | OUTPATIENT
Start: 2024-01-01 | End: 2024-01-01

## 2024-01-01 RX ORDER — INSULIN LISPRO 100/ML
VIAL (ML) SUBCUTANEOUS EVERY 6 HOURS
Refills: 0 | Status: DISCONTINUED | OUTPATIENT
Start: 2024-01-01 | End: 2024-01-01

## 2024-01-01 RX ORDER — MORPHINE SULFATE 50 MG/1
4 CAPSULE, EXTENDED RELEASE ORAL ONCE
Refills: 0 | Status: DISCONTINUED | OUTPATIENT
Start: 2024-01-01 | End: 2024-01-01

## 2024-01-01 RX ORDER — RIVASTIGMINE 4.6 MG/24H
1 PATCH, EXTENDED RELEASE TRANSDERMAL
Refills: 0 | DISCHARGE

## 2024-01-01 RX ORDER — MORPHINE SULFATE 50 MG/1
2 CAPSULE, EXTENDED RELEASE ORAL ONCE
Refills: 0 | Status: DISCONTINUED | OUTPATIENT
Start: 2024-01-01 | End: 2024-01-01

## 2024-01-01 RX ORDER — LACTULOSE 10 G/15ML
15 SOLUTION ORAL
Refills: 0 | DISCHARGE

## 2024-01-01 RX ORDER — SODIUM CHLORIDE 9 MG/ML
1000 INJECTION INTRAMUSCULAR; INTRAVENOUS; SUBCUTANEOUS ONCE
Refills: 0 | Status: COMPLETED | OUTPATIENT
Start: 2024-01-01 | End: 2024-01-01

## 2024-01-01 RX ORDER — MORPHINE SULFATE 50 MG/1
4 CAPSULE, EXTENDED RELEASE ORAL EVERY 6 HOURS
Refills: 0 | Status: DISCONTINUED | OUTPATIENT
Start: 2024-01-01 | End: 2024-01-01

## 2024-01-01 RX ORDER — PREGABALIN 225 MG/1
1 CAPSULE ORAL
Refills: 0 | DISCHARGE

## 2024-01-01 RX ORDER — CEFTRIAXONE 500 MG/1
2000 INJECTION, POWDER, FOR SOLUTION INTRAMUSCULAR; INTRAVENOUS EVERY 24 HOURS
Refills: 0 | Status: DISCONTINUED | OUTPATIENT
Start: 2024-01-01 | End: 2024-01-01

## 2024-01-01 RX ORDER — DEXTROSE 50 % IN WATER 50 %
15 SYRINGE (ML) INTRAVENOUS ONCE
Refills: 0 | Status: DISCONTINUED | OUTPATIENT
Start: 2024-01-01 | End: 2024-01-01

## 2024-01-01 RX ORDER — CARBIDOPA AND LEVODOPA 25; 100 MG/1; MG/1
1 TABLET ORAL
Refills: 0 | DISCHARGE

## 2024-01-01 RX ORDER — SENNA PLUS 8.6 MG/1
2 TABLET ORAL
Refills: 0 | DISCHARGE

## 2024-01-01 RX ADMIN — MORPHINE SULFATE 1 MILLIGRAM(S): 50 CAPSULE, EXTENDED RELEASE ORAL at 09:07

## 2024-01-01 RX ADMIN — HYDROMORPHONE HYDROCHLORIDE 1 MILLIGRAM(S): 2 INJECTION INTRAMUSCULAR; INTRAVENOUS; SUBCUTANEOUS at 15:31

## 2024-01-01 RX ADMIN — MORPHINE SULFATE 4 MILLIGRAM(S): 50 CAPSULE, EXTENDED RELEASE ORAL at 14:09

## 2024-01-01 RX ADMIN — MORPHINE SULFATE 4 MILLIGRAM(S): 50 CAPSULE, EXTENDED RELEASE ORAL at 10:26

## 2024-01-01 RX ADMIN — MORPHINE SULFATE 4 MILLIGRAM(S): 50 CAPSULE, EXTENDED RELEASE ORAL at 10:41

## 2024-01-01 RX ADMIN — Medication 200 MILLIGRAM(S): at 22:50

## 2024-01-01 RX ADMIN — MORPHINE SULFATE 4 MILLIGRAM(S): 50 CAPSULE, EXTENDED RELEASE ORAL at 08:25

## 2024-01-01 RX ADMIN — HYDROMORPHONE HYDROCHLORIDE 1 MILLIGRAM(S): 2 INJECTION INTRAMUSCULAR; INTRAVENOUS; SUBCUTANEOUS at 22:00

## 2024-01-01 RX ADMIN — ROBINUL 0.4 MILLIGRAM(S): 0.2 INJECTION INTRAMUSCULAR; INTRAVENOUS at 05:08

## 2024-01-01 RX ADMIN — MORPHINE SULFATE 4 MILLIGRAM(S): 50 CAPSULE, EXTENDED RELEASE ORAL at 23:40

## 2024-01-01 RX ADMIN — MORPHINE SULFATE 4 MILLIGRAM(S): 50 CAPSULE, EXTENDED RELEASE ORAL at 11:00

## 2024-01-01 RX ADMIN — SODIUM CHLORIDE 2200 MILLILITER(S): 9 INJECTION INTRAMUSCULAR; INTRAVENOUS; SUBCUTANEOUS at 01:41

## 2024-01-01 RX ADMIN — MORPHINE SULFATE 4 MILLIGRAM(S): 50 CAPSULE, EXTENDED RELEASE ORAL at 22:10

## 2024-01-01 RX ADMIN — MORPHINE SULFATE 4 MILLIGRAM(S): 50 CAPSULE, EXTENDED RELEASE ORAL at 11:58

## 2024-01-01 RX ADMIN — MORPHINE SULFATE 4 MILLIGRAM(S): 50 CAPSULE, EXTENDED RELEASE ORAL at 11:46

## 2024-01-01 RX ADMIN — HYDROMORPHONE HYDROCHLORIDE 1 MILLIGRAM(S): 2 INJECTION INTRAMUSCULAR; INTRAVENOUS; SUBCUTANEOUS at 05:49

## 2024-01-01 RX ADMIN — MORPHINE SULFATE 4 MILLIGRAM(S): 50 CAPSULE, EXTENDED RELEASE ORAL at 00:10

## 2024-01-01 RX ADMIN — MORPHINE SULFATE 4 MILLIGRAM(S): 50 CAPSULE, EXTENDED RELEASE ORAL at 05:40

## 2024-01-01 RX ADMIN — SCOPALAMINE 1 PATCH: 1 PATCH, EXTENDED RELEASE TRANSDERMAL at 18:35

## 2024-01-01 RX ADMIN — MORPHINE SULFATE 4 MILLIGRAM(S): 50 CAPSULE, EXTENDED RELEASE ORAL at 22:50

## 2024-01-01 RX ADMIN — MORPHINE SULFATE 4 MILLIGRAM(S): 50 CAPSULE, EXTENDED RELEASE ORAL at 19:46

## 2024-01-01 RX ADMIN — MORPHINE SULFATE 4 MILLIGRAM(S): 50 CAPSULE, EXTENDED RELEASE ORAL at 09:00

## 2024-01-01 RX ADMIN — MORPHINE SULFATE 4 MILLIGRAM(S): 50 CAPSULE, EXTENDED RELEASE ORAL at 00:26

## 2024-01-01 RX ADMIN — MORPHINE SULFATE 4 MILLIGRAM(S): 50 CAPSULE, EXTENDED RELEASE ORAL at 23:20

## 2024-01-01 RX ADMIN — ROBINUL 0.4 MILLIGRAM(S): 0.2 INJECTION INTRAMUSCULAR; INTRAVENOUS at 17:03

## 2024-01-01 RX ADMIN — ROBINUL 0.4 MILLIGRAM(S): 0.2 INJECTION INTRAMUSCULAR; INTRAVENOUS at 00:00

## 2024-01-01 RX ADMIN — HYDROMORPHONE HYDROCHLORIDE 1 MILLIGRAM(S): 2 INJECTION INTRAMUSCULAR; INTRAVENOUS; SUBCUTANEOUS at 17:37

## 2024-01-01 RX ADMIN — MORPHINE SULFATE 4 MILLIGRAM(S): 50 CAPSULE, EXTENDED RELEASE ORAL at 12:32

## 2024-01-01 RX ADMIN — Medication 650 MILLIGRAM(S): at 23:52

## 2024-01-01 RX ADMIN — CEFEPIME 100 MILLIGRAM(S): 1 INJECTION, POWDER, FOR SOLUTION INTRAMUSCULAR; INTRAVENOUS at 01:40

## 2024-01-01 RX ADMIN — Medication 500 MILLIGRAM(S): at 23:24

## 2024-01-01 RX ADMIN — MORPHINE SULFATE 4 MILLIGRAM(S): 50 CAPSULE, EXTENDED RELEASE ORAL at 21:48

## 2024-01-01 RX ADMIN — MORPHINE SULFATE 4 MILLIGRAM(S): 50 CAPSULE, EXTENDED RELEASE ORAL at 05:36

## 2024-01-01 RX ADMIN — HYDROMORPHONE HYDROCHLORIDE 1 MILLIGRAM(S): 2 INJECTION INTRAMUSCULAR; INTRAVENOUS; SUBCUTANEOUS at 17:52

## 2024-01-01 RX ADMIN — HYDROMORPHONE HYDROCHLORIDE 1 MILLIGRAM(S): 2 INJECTION INTRAMUSCULAR; INTRAVENOUS; SUBCUTANEOUS at 15:21

## 2024-01-01 RX ADMIN — AZITHROMYCIN 255 MILLIGRAM(S): 500 TABLET, FILM COATED ORAL at 01:40

## 2024-01-01 RX ADMIN — Medication 650 MILLIGRAM(S): at 00:22

## 2024-01-01 RX ADMIN — AZITHROMYCIN 500 MILLIGRAM(S): 500 TABLET, FILM COATED ORAL at 02:54

## 2024-01-01 RX ADMIN — MORPHINE SULFATE 4 MILLIGRAM(S): 50 CAPSULE, EXTENDED RELEASE ORAL at 11:31

## 2024-01-01 RX ADMIN — MORPHINE SULFATE 2 MILLIGRAM(S): 50 CAPSULE, EXTENDED RELEASE ORAL at 10:01

## 2024-01-01 RX ADMIN — ROBINUL 0.4 MILLIGRAM(S): 0.2 INJECTION INTRAMUSCULAR; INTRAVENOUS at 05:36

## 2024-01-01 RX ADMIN — ROBINUL 0.4 MILLIGRAM(S): 0.2 INJECTION INTRAMUSCULAR; INTRAVENOUS at 10:25

## 2024-01-01 RX ADMIN — SODIUM CHLORIDE 1000 MILLILITER(S): 9 INJECTION INTRAMUSCULAR; INTRAVENOUS; SUBCUTANEOUS at 03:00

## 2024-01-01 RX ADMIN — MORPHINE SULFATE 4 MILLIGRAM(S): 50 CAPSULE, EXTENDED RELEASE ORAL at 17:03

## 2024-01-01 RX ADMIN — SCOPALAMINE 1 PATCH: 1 PATCH, EXTENDED RELEASE TRANSDERMAL at 07:57

## 2024-01-01 RX ADMIN — SCOPALAMINE 1 PATCH: 1 PATCH, EXTENDED RELEASE TRANSDERMAL at 05:34

## 2024-01-01 RX ADMIN — MORPHINE SULFATE 4 MILLIGRAM(S): 50 CAPSULE, EXTENDED RELEASE ORAL at 23:58

## 2024-01-01 RX ADMIN — MORPHINE SULFATE 4 MILLIGRAM(S): 50 CAPSULE, EXTENDED RELEASE ORAL at 16:10

## 2024-01-01 RX ADMIN — MORPHINE SULFATE 4 MILLIGRAM(S): 50 CAPSULE, EXTENDED RELEASE ORAL at 17:18

## 2024-01-01 RX ADMIN — ROBINUL 0.4 MILLIGRAM(S): 0.2 INJECTION INTRAMUSCULAR; INTRAVENOUS at 10:46

## 2024-01-01 RX ADMIN — ROBINUL 0.4 MILLIGRAM(S): 0.2 INJECTION INTRAMUSCULAR; INTRAVENOUS at 23:19

## 2024-01-01 RX ADMIN — ROBINUL 0.4 MILLIGRAM(S): 0.2 INJECTION INTRAMUSCULAR; INTRAVENOUS at 05:48

## 2024-01-01 RX ADMIN — MORPHINE SULFATE 4 MILLIGRAM(S): 50 CAPSULE, EXTENDED RELEASE ORAL at 10:19

## 2024-01-01 RX ADMIN — Medication 6 MILLIGRAM(S): at 04:26

## 2024-01-01 RX ADMIN — HYDROMORPHONE HYDROCHLORIDE 1 MILLIGRAM(S): 2 INJECTION INTRAMUSCULAR; INTRAVENOUS; SUBCUTANEOUS at 21:40

## 2024-01-01 RX ADMIN — MORPHINE SULFATE 4 MILLIGRAM(S): 50 CAPSULE, EXTENDED RELEASE ORAL at 04:19

## 2024-01-01 RX ADMIN — ROBINUL 0.4 MILLIGRAM(S): 0.2 INJECTION INTRAMUSCULAR; INTRAVENOUS at 17:11

## 2024-01-01 RX ADMIN — MORPHINE SULFATE 4 MILLIGRAM(S): 50 CAPSULE, EXTENDED RELEASE ORAL at 10:45

## 2024-01-01 RX ADMIN — SODIUM CHLORIDE 2200 MILLILITER(S): 9 INJECTION INTRAMUSCULAR; INTRAVENOUS; SUBCUTANEOUS at 02:00

## 2024-01-01 RX ADMIN — MORPHINE SULFATE 4 MILLIGRAM(S): 50 CAPSULE, EXTENDED RELEASE ORAL at 15:27

## 2024-01-01 RX ADMIN — MORPHINE SULFATE 4 MILLIGRAM(S): 50 CAPSULE, EXTENDED RELEASE ORAL at 10:34

## 2024-01-01 RX ADMIN — HYDROMORPHONE HYDROCHLORIDE 1 MILLIGRAM(S): 2 INJECTION INTRAMUSCULAR; INTRAVENOUS; SUBCUTANEOUS at 02:15

## 2024-01-01 RX ADMIN — ROBINUL 0.4 MILLIGRAM(S): 0.2 INJECTION INTRAMUSCULAR; INTRAVENOUS at 17:40

## 2024-01-01 RX ADMIN — SODIUM CHLORIDE 1000 MILLILITER(S): 9 INJECTION, SOLUTION INTRAVENOUS at 05:21

## 2024-01-01 RX ADMIN — Medication 100 MILLIGRAM(S): at 01:41

## 2024-01-01 RX ADMIN — SODIUM CHLORIDE 1000 MILLILITER(S): 9 INJECTION INTRAMUSCULAR; INTRAVENOUS; SUBCUTANEOUS at 02:00

## 2024-01-01 RX ADMIN — CEFEPIME 2000 MILLIGRAM(S): 1 INJECTION, POWDER, FOR SOLUTION INTRAMUSCULAR; INTRAVENOUS at 02:10

## 2024-01-01 RX ADMIN — MORPHINE SULFATE 4 MILLIGRAM(S): 50 CAPSULE, EXTENDED RELEASE ORAL at 12:29

## 2024-01-01 RX ADMIN — CEFEPIME 100 MILLIGRAM(S): 1 INJECTION, POWDER, FOR SOLUTION INTRAMUSCULAR; INTRAVENOUS at 06:03

## 2024-01-01 RX ADMIN — SCOPALAMINE 1 PATCH: 1 PATCH, EXTENDED RELEASE TRANSDERMAL at 17:49

## 2024-01-01 RX ADMIN — HYDROMORPHONE HYDROCHLORIDE 1 MILLIGRAM(S): 2 INJECTION INTRAMUSCULAR; INTRAVENOUS; SUBCUTANEOUS at 02:00

## 2024-01-01 RX ADMIN — ROBINUL 0.4 MILLIGRAM(S): 0.2 INJECTION INTRAMUSCULAR; INTRAVENOUS at 23:59

## 2024-01-01 RX ADMIN — Medication 400 MILLIGRAM(S): at 14:52

## 2024-01-01 RX ADMIN — SODIUM CHLORIDE 10 MILLILITER(S): 9 INJECTION, SOLUTION INTRAVENOUS at 17:03

## 2024-01-01 RX ADMIN — MORPHINE SULFATE 1 MILLIGRAM(S): 50 CAPSULE, EXTENDED RELEASE ORAL at 08:52

## 2024-01-01 RX ADMIN — MORPHINE SULFATE 4 MILLIGRAM(S): 50 CAPSULE, EXTENDED RELEASE ORAL at 05:07

## 2024-01-01 RX ADMIN — MORPHINE SULFATE 4 MILLIGRAM(S): 50 CAPSULE, EXTENDED RELEASE ORAL at 05:56

## 2024-01-01 RX ADMIN — SCOPALAMINE 1 PATCH: 1 PATCH, EXTENDED RELEASE TRANSDERMAL at 16:11

## 2024-01-01 RX ADMIN — Medication 0.5 MILLIGRAM(S): at 08:10

## 2024-01-01 RX ADMIN — MORPHINE SULFATE 4 MILLIGRAM(S): 50 CAPSULE, EXTENDED RELEASE ORAL at 04:45

## 2024-01-01 RX ADMIN — MORPHINE SULFATE 2 MILLIGRAM(S): 50 CAPSULE, EXTENDED RELEASE ORAL at 10:16

## 2024-01-01 RX ADMIN — CHLORHEXIDINE GLUCONATE 1 APPLICATION(S): 213 SOLUTION TOPICAL at 05:43

## 2024-01-23 NOTE — ED PROVIDER NOTE - PHYSICAL EXAMINATION
VITAL SIGNS: I have reviewed nursing notes and confirm.  CONSTITUTIONAL: Tachypneic arousable coarse bilateral breath sounds dry mucous membranes in moderate respiratory distress hypoxic to 80%   SKIN:  warm and dry, no acute rash.   HEAD:  normocephalic, atraumatic.  EYES: EOM intact; conjunctiva and sclera clear.  ENT: No nasal discharge; airway clear.   NECK: Supple.  CARD:  hypotensive tachycardic febrile  RESP:   coarse bibasilar crackles with intermittent wheezes tachypneic   ABD:  nondistended abdomen diffusely tender patient groans in pain  EXT: Normal ROM. No peripheral edema. Pulses intact and equal b/l.  NEURO:  lethargic eyes closed pale groans in pain tachypneic  PSYCH: Cooperative, mood and affect appropriate.

## 2024-01-23 NOTE — CONSULT NOTE ADULT - ATTENDING COMMENTS
Fish Chandler  ED ->ICU  [id number]  This is a 70 y/o male NHR with hypoxia and hypotension.  He was placed on BiPAP given cefepime and Zithromax is covid (+) and was given decadron, CXR with bilateral infiltrate.  -AMS with lethargy/metabolic encephalopathy  -acute respiratory failure with hypoxia  -bilateral PNA (covid (+))  -severe sepsis  -Parkinson's disease  -hypernatremia  >slow correction of Na  >treat PNA, airborne precaution  >MAP >65 mmHg  >c/w decadron  Please see above for the rest of the details.  This patient is critically ill and needs ICU management.

## 2024-01-23 NOTE — CONSULT NOTE ADULT - PROBLEM SELECTOR RECOMMENDATION 5
.  See [facility] above.  - DNR/DNI  - Comfort measure only  - Surrogates: Elba Mcguire ([phone]), Ade Garcia ([phone])    In addition to the E/M visit, an advance care planning meeting was performed. Start time: 10:00am; End time: 10:16am; Total time: 16 min. A face to face meeting to discuss advance care planning was held today regarding: [facility]. Primary decision maker: Patient is unable to participate in decision making; Alternate/surrogate: Elba and Ade (sisters). Discussed advance directives including, but not limited to, healthcare proxy and code status. Decision regarding code status: DNR/DNI; Documentation completed today: [place] [facility] note

## 2024-01-23 NOTE — ED ADULT NURSE REASSESSMENT NOTE - NS ED NURSE REASSESS COMMENT FT1
RN assessed 9 wounds in patient. 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 All dressings changed in ED by RN RN assessed 10 wounds in patient. 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 All dressings changed in ED by RN.

## 2024-01-23 NOTE — CONSULT NOTE ADULT - NS ATTEST RISK PROBLEM GEN_ALL_CORE FT
Actively Dying  Acute Illness That Poses A Threat To Life  Decision Made To De-escalate Care Due To Poor Prognosis  Prescription Of Parenteral Controlled Substances

## 2024-01-23 NOTE — ED PROVIDER NOTE - OBJECTIVE STATEMENT
69-year-old male with  past medical history of HTN, HLD, and parkinsons, presents to ER from [facility] in respiratory distress with O2 saturation in the low 80s and hypotensive 76/51. History is limited as patient is lethargic and demented at baseline.  On arrival EMS are bagging patient patient is somnolent lethargic but arousable groaning in pain.  22 gauge peripheral IV placed by [facility] rehab, EMS do not know fingerstick lasted vitals 70/40 tachycardic to 130 bpm as per EMS ([facility])

## 2024-01-23 NOTE — ED ADULT NURSE NOTE - OBJECTIVE STATEMENT
70 yo M BIBEMS w/ respiratory distress 70 yo M PMHX HTN, HLD, and parkinsons, BIBEMS [facility] nursing home in respiratory distress with O2 saturation in the low 80s and hypotensive 76/51. Pt is nonverbal, and contracted. RN placed two large bore IVs and started fluids and medications per providers' orders.

## 2024-01-23 NOTE — CONSULT NOTE ADULT - PROBLEM SELECTOR RECOMMENDATION 6
.   Following for symptom management. Will continue to follow.    For new or uncontrolled symptoms, please call Palliative Care at [phone]. The service is available 24/7 (including nights & weekends) to provide symptom management recommendations over the phone as appropriate    Autumn Bean MD  Palliative Care Attending  Geriatrics and Palliative Care Consult Service

## 2024-01-23 NOTE — CHART NOTE - NSCHARTNOTEFT_GEN_A_CORE
~730AM, called patient's sister Elba Mcguire ([phone]) to inform her of patient's critical status and admission to MICU. Elba as well as her sister Ade Garcia ([phone]) are decision makers and HCP. Explained at length patient's medical status including acute hypoxic respiratory failure in the setting of COVID-19 and pneumonia with worsening respiratory status despite being on bipap. Patient's sisters wish to prioritize his comfort. Writer was informed by sister Elba that patient has had declining mental status in the past 1-2 years and significantly in the last 6 months. They would not want chest compressions if his heart were to stop and would not want intubation if worsening respiratory status with indication of intubation. They would not want pressors and would want medications including pain medication to keep him comfortable and no blood draws. Decision made to make patient DNR/DNI with comfort care with prioritization of patient's comfort. MOLST filled out. Palliative team consulted.

## 2024-01-23 NOTE — H&P ADULT - NSICDXPASTMEDICALHX_GEN_ALL_CORE_FT
"Zack Demarco's goals for this visit include:   Chief Complaint   Patient presents with     RECHECK     1 year follow up, S/P CABG, CAD       He requests these members of his care team be copied on today's visit information: no    PCP: Anastacio Love    Referring Provider:  Referred Self, MD  No address on file    /57 (BP Location: Left arm, Patient Position: Sitting, Cuff Size: Adult Large)   Pulse (!) 46   Ht 1.778 m (5' 10\")   Wt 95.6 kg (210 lb 12.8 oz)   SpO2 97%   BMI 30.25 kg/m      Do you need any medication refills at today's visit? no    " PAST MEDICAL HISTORY:  BPH (benign prostatic hyperplasia)     History of depression     History of Parkinson's disease     HTN (hypertension)     Hyperlipidemia

## 2024-01-23 NOTE — PROGRESS NOTE ADULT - ASSESSMENT
69-year-old male with PMHx Parkinson's disease, HTN, HLD, depression, pressure ulcers (L hip, medial knees), BPH, renal calculus presented from [facility] for respiratory distress, hypoxia, and hypotension; found to be in severe sepsis acute hypoxic respiratory failure likely iso COVID and superimposed bacterial PNA. Pt made DNR/DNI and CMO    NEURO  #AMS  #Parkinson's disease  Pt with Parkinson's disease presenting with lethargy. Likely iso sepsis. On home Carbidopa-Levodopa 25-100mg TID, Memantine 7mg qd and Rivastigmine transdermal patch qd.   - CMO      PULM  #Acute hypoxic respiratory failure   #ARDS, severe  Pt presented from [facility] with hypoxia, likely iso of COVID and superimposed bacterial PNA. Initially in the ED, satting 86% at 15L nonrebreather mask then switched to HFNC 40/70, satting 91%, however persisted to be tachypneic -> placed on BiPAP. P/F ratio 99mmHg.  - Continue with BiPAP and morphine, lorazepam, and glycopyrrolate for CMO    #PNA, bilateral  Pt p/w superimposed bacterial PNA. CXR: left lower-mid infiltrate. CT angio chest: no PE. Extensive bilateral lower lobe and perihilar infiltrates, bilateral hilar lymphadenopathy. Procalcitonin elevated at 1.86. Given Cefepime 2g IV x1 and azithromycin 500mg IV x1 in the ED. CURB-65 score of 5, highest risk, requiring ICU admission. S/p cefepime and azithromycin.  - CMO    #COVID  Pt found to be (+) for COVID. S/p Decadron 6mg IV x1 in the ED. s/p Decadron 6mg IV qd for 1 day  - CMO    CARDS  ANGELA      GI    #Transaminitis   Presented with elevated LFTs, AST 94, ALT 76, Tbili 2, . CTAP with no mass on liver; contracted gallbladder; unremarkable pancreas.   - CMO    #Constipation   Pt with constipation, on home Miralax qd, senna 2 tabs qhs, lactulose 15cc BID. CT angio AP: Rectum distended with stool and mild rectal wall thickening with edema, possible proctitis. No acute abdominal process.  - CMO    ID  #Severe sepsis  Pt presented with severe sepsis (, RR 28, lactate 7.1). Likely multifactorial iso COVID, superimposed bacterial PNA and UTI. S/p 3.2L NS and 1L LR with hydrocortisone 100mg IVP x1.   - CMO    #PNA, bilateral  Pt p/w superimposed bacterial PNA. CXR: left lower-mid infiltrate. CT angio chest: no PE. Extensive bilateral lower lobe and perihilar infiltrates, bilateral hilar lymphadenopathy. Procalcitonin elevated at 1.86. Given Cefepime 2g IV x1 and azithromycin 500mg IV x1 in the ED. CURB-65 score of 5, highest risk, requiring ICU admission.   - CMO    #COVID  Pt found to be (+) for COVID. S/p Decadron 6mg IV x1 in the ED.  s/p Decadron 6mg IV qd (1/23)  - CMO    #UTI  Found to have UA (+) LE/nitrite/WBC/bacteria. Unable to assess for symptoms due to pt's mental status.  - CMO      RENAL  #Hypernatremia  Pt p/w Na 156 in the ED. Per nursing home chart, pt was started on D5+1/2NS for 3 days. S/p 3.2L of NS bolus and 1L LR bolus in the ED.   - CMO    #HAGMA with respiratory acidosis  Pt p/w bicarb 21, AG 17, VBG pH 7.26, pCO2 56, lactate 7.1. HAGMA likely due to lactic acidosis. Placed initially on HFNC then BiPAP. Repeat ABG with pH 7.14, pCO2 72; lactate cleared.  - CMO         #BPH  Pt with BPH, on home Finasteride 5mg qd.  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  -CMO      PROPHYLACTIC MEASURES:  F: S/p 3.2L NS and 1L LR  E: Replete as needed  D: NPO  DVT ppx: Lovenox 40mg SQ qd  GI ppx: Protonix 40mg IV qd  Code: DNR/DNI/Mews exempt, CMO  Dispo: MICU 69-year-old male with PMHx Parkinson's disease, HTN, HLD, depression, pressure ulcers (L hip, medial knees), BPH, renal calculus presented from [facility] for respiratory distress, hypoxia, and hypotension; found to be in severe sepsis acute hypoxic respiratory failure likely iso COVID and superimposed bacterial PNA. Pt made DNR/DNI and CMO    NEURO    #AMS  #Parkinson's disease  Pt with Parkinson's disease presenting with lethargy. Likely iso sepsis. On home Carbidopa-Levodopa 25-100mg TID, Memantine 7mg qd and Rivastigmine transdermal patch qd.   - CMO      PULM    #Acute hypoxic respiratory failure   #ARDS, severe  Pt presented from [facility] with hypoxia, likely iso of COVID and superimposed bacterial PNA. Initially in the ED, satting 86% at 15L nonrebreather mask then switched to HFNC 40/70, satting 91%, however persisted to be tachypneic -> placed on BiPAP. P/F ratio 99mmHg.  - Continue with BiPAP and morphine, lorazepam, and glycopyrrolate for CMO    #PNA, bilateral  Pt p/w superimposed bacterial PNA. CXR: left lower-mid infiltrate. CT angio chest: no PE. Extensive bilateral lower lobe and perihilar infiltrates, bilateral hilar lymphadenopathy. Procalcitonin elevated at 1.86. Given Cefepime 2g IV x1 and azithromycin 500mg IV x1 in the ED. CURB-65 score of 5, highest risk, requiring ICU admission. S/p cefepime and azithromycin.  - CMO    #COVID  Pt found to be (+) for COVID. S/p Decadron 6mg IV x1 in the ED. s/p Decadron 6mg IV qd for 1 day  - CMO    CARDS    ANGELA      GI    #Transaminitis   Presented with elevated LFTs, AST 94, ALT 76, Tbili 2, . CTAP with no mass on liver; contracted gallbladder; unremarkable pancreas.   - CMO    #Constipation   Pt with constipation, on home Miralax qd, senna 2 tabs qhs, lactulose 15cc BID. CT angio AP: Rectum distended with stool and mild rectal wall thickening with edema, possible proctitis. No acute abdominal process.  - CMO    ID    #Severe sepsis  Pt presented with severe sepsis (, RR 28, lactate 7.1). Likely multifactorial iso COVID, superimposed bacterial PNA and UTI. S/p 3.2L NS and 1L LR with hydrocortisone 100mg IVP x1.   - CMO    #PNA, bilateral  Pt p/w superimposed bacterial PNA. CXR: left lower-mid infiltrate. CT angio chest: no PE. Extensive bilateral lower lobe and perihilar infiltrates, bilateral hilar lymphadenopathy. Procalcitonin elevated at 1.86. Given Cefepime 2g IV x1 and azithromycin 500mg IV x1 in the ED. CURB-65 score of 5, highest risk, requiring ICU admission.   - CMO    #COVID  Pt found to be (+) for COVID. S/p Decadron 6mg IV x1 in the ED.  s/p Decadron 6mg IV qd (1/23)  - CMO    #UTI  Found to have UA (+) LE/nitrite/WBC/bacteria. Unable to assess for symptoms due to pt's mental status.  - CMO      RENAL    #Hypernatremia  Pt p/w Na 156 in the ED. Per nursing home chart, pt was started on D5+1/2NS for 3 days. S/p 3.2L of NS bolus and 1L LR bolus in the ED.   - CMO    #HAGMA with respiratory acidosis  Pt p/w bicarb 21, AG 17, VBG pH 7.26, pCO2 56, lactate 7.1. HAGMA likely due to lactic acidosis. Placed initially on HFNC then BiPAP. Repeat ABG with pH 7.14, pCO2 72; lactate cleared.  - CMO           #BPH  Pt with BPH, on home Finasteride 5mg qd.  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  -CMO      PROPHYLACTIC MEASURES:  F: none  E: no more labs  D: NPO  DVT ppx: Lovenox 40mg SQ qd  GI ppx: Protonix 40mg IV qd  Code: DNR/DNI/Mews exempt, CMO  Dispo: 7WO

## 2024-01-23 NOTE — CONSULT NOTE ADULT - PROBLEM SELECTOR RECOMMENDATION 3
.  Patient with long h/o PD and cognitive decline over past 1-2 years. Bedbound over last few months  - Supportive care

## 2024-01-23 NOTE — ED ADULT TRIAGE NOTE - NS ED TRIAGE AVPU SCALE
Verbal - The patient responds to verbal stimuli by opening their eyes when someone speaks to them. The patient is not fully oriented to time, place, or person. Speaking Coherently

## 2024-01-23 NOTE — PROGRESS NOTE ADULT - SUBJECTIVE AND OBJECTIVE BOX
O/N Events:    Subjective/ROS: Patient seen and examined at bedside.     Denies Fever/Chills, HA, CP, SOB, n/v, changes in bowel/urinary habits.  12pt ROS otherwise negative.    VITALS  Vital Signs Last 24 Hrs  T(C): 37.6 (23 Jan 2024 06:22), Max: 37.7 (23 Jan 2024 02:45)  T(F): 99.7 (23 Jan 2024 06:22), Max: 99.9 (23 Jan 2024 02:45)  HR: 114 (23 Jan 2024 06:15) (91 - 122)  BP: 96/54 (23 Jan 2024 06:15) (85/53 - 117/68)  BP(mean): 74 (23 Jan 2024 06:15) (67 - 86)  RR: 38 (23 Jan 2024 06:15) (28 - 38)  SpO2: 93% (23 Jan 2024 06:15) (86% - 100%)    Parameters below as of 23 Jan 2024 06:15  Patient On (Oxygen Delivery Method): BiPAP/CPAP    O2 Concentration (%): 60    CAPILLARY BLOOD GLUCOSE      POCT Blood Glucose.: 87 mg/dL (23 Jan 2024 01:03)      PHYSICAL EXAM  General: NAD  Head: NC/AT; MMM; PERRL; EOMI;  Neck: Supple; no JVD  Respiratory: CTAB; no wheezes/rales/rhonchi  Cardiovascular: Regular rhythm/rate; S1/S2+, no murmurs, rubs gallops   Gastrointestinal: Soft; NTND; bowel sounds normal and present  Extremities: WWP; no edema/cyanosis  Neurological: A&Ox3, CNII-XII grossly intact; no obvious focal deficits    MEDICATIONS  (STANDING):  dextrose 5%. 1000 milliLiter(s) (100 mL/Hr) IV Continuous <Continuous>  dextrose 5%. 1000 milliLiter(s) (50 mL/Hr) IV Continuous <Continuous>  dextrose 50% Injectable 12.5 Gram(s) IV Push once  dextrose 50% Injectable 25 Gram(s) IV Push once  dextrose 50% Injectable 25 Gram(s) IV Push once  enoxaparin Injectable 40 milliGRAM(s) SubCutaneous every 24 hours  glucagon  Injectable 1 milliGRAM(s) IntraMuscular once  insulin lispro (ADMELOG) corrective regimen sliding scale   SubCutaneous three times a day before meals  lactated ringers. 1000 milliLiter(s) (1000 mL/Hr) IV Continuous <Continuous>  pantoprazole  Injectable 40 milliGRAM(s) IV Push every 24 hours    MEDICATIONS  (PRN):  dextrose Oral Gel 15 Gram(s) Oral once PRN Blood Glucose LESS THAN 70 milliGRAM(s)/deciliter      Allergy Status Unknown      LABS                        12.6   13.75 )-----------( 454      ( 23 Jan 2024 01:20 )             42.2     01-23    156<H>  |  118<H>  |  35<H>  ----------------------------<  130<H>  4.5   |  21<L>  |  0.91    Ca    9.6      23 Jan 2024 01:20    TPro  7.4  /  Alb  2.8<L>  /  TBili  2.0<H>  /  DBili  x   /  AST  94<H>  /  ALT  76<H>  /  AlkPhos  242<H>  01-23    PT/INR - ( 23 Jan 2024 01:20 )   PT: 15.6 sec;   INR: 1.38          PTT - ( 23 Jan 2024 01:20 )  PTT:29.1 sec  Urinalysis Basic - ( 23 Jan 2024 04:22 )    Color: Dark Yellow / Appearance: Turbid / SG: >1.030 / pH: x  Gluc: x / Ketone: Negative mg/dL  / Bili: Small / Urobili: 1.0 mg/dL   Blood: x / Protein: 100 mg/dL / Nitrite: Positive   Leuk Esterase: Moderate / RBC: 46 /HPF / WBC 90 /HPF   Sq Epi: x / Non Sq Epi: 2 /HPF / Bacteria: Moderate /HPF              IMAGING/EKG/ETC   O/N Events:    Subjective/ROS: Patient seen and examined at bedside. Pt intubated and does not awake to sternal rub    VITALS  Vital Signs Last 24 Hrs  T(C): 37.6 (23 Jan 2024 06:22), Max: 37.7 (23 Jan 2024 02:45)  T(F): 99.7 (23 Jan 2024 06:22), Max: 99.9 (23 Jan 2024 02:45)  HR: 114 (23 Jan 2024 06:15) (91 - 122)  BP: 96/54 (23 Jan 2024 06:15) (85/53 - 117/68)  BP(mean): 74 (23 Jan 2024 06:15) (67 - 86)  RR: 38 (23 Jan 2024 06:15) (28 - 38)  SpO2: 93% (23 Jan 2024 06:15) (86% - 100%)    Parameters below as of 23 Jan 2024 06:15  Patient On (Oxygen Delivery Method): BiPAP/CPAP    O2 Concentration (%): 60    CAPILLARY BLOOD GLUCOSE      POCT Blood Glucose.: 87 mg/dL (23 Jan 2024 01:03)      PHYSICAL EXAM  General: cachectic, appears uncomfortble on biPAP  Head: NC/AT; MMM; PERRL; EOMI;  Neck: Supple; no JVD  Respiratory: decreased breath sounds, tachypnic, belly breathing, using accessory muscles  Cardiovascular: tachycardic, Regular rhythm; S1/S2+, no murmurs, rubs gallops   Gastrointestinal: Soft; NTND; bowel sounds normal and present  Extremities: WWP; no edema/cyanosis  Neurological: does not awake to sternal rub  dermatologist: multiple wounds to lower extremities: stage 2 to L heel, medial L foot, L shin, L knee, R foot, R knee, R outer thigh, sacral, and upper back; and 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 1000 milliLiter(s) (100 mL/Hr) IV Continuous <Continuous>  dextrose 5%. 1000 milliLiter(s) (50 mL/Hr) IV Continuous <Continuous>  dextrose 50% Injectable 12.5 Gram(s) IV Push once  dextrose 50% Injectable 25 Gram(s) IV Push once  dextrose 50% Injectable 25 Gram(s) IV Push once  enoxaparin Injectable 40 milliGRAM(s) SubCutaneous every 24 hours  glucagon  Injectable 1 milliGRAM(s) IntraMuscular once  insulin lispro (ADMELOG) corrective regimen sliding scale   SubCutaneous three times a day before meals  lactated ringers. 1000 milliLiter(s) (1000 mL/Hr) IV Continuous <Continuous>  pantoprazole  Injectable 40 milliGRAM(s) IV Push every 24 hours    MEDICATIONS  (PRN):  dextrose Oral Gel 15 Gram(s) Oral once PRN Blood Glucose LESS THAN 70 milliGRAM(s)/deciliter      Allergy Status Unknown      LABS                        12.6   13.75 )-----------( 454      ( 23 Jan 2024 01:20 )             42.2     01-23    156<H>  |  118<H>  |  35<H>  ----------------------------<  130<H>  4.5   |  21<L>  |  0.91    Ca    9.6      23 Jan 2024 01:20    TPro  7.4  /  Alb  2.8<L>  /  TBili  2.0<H>  /  DBili  x   /  AST  94<H>  /  ALT  76<H>  /  AlkPhos  242<H>  01-23    PT/INR - ( 23 Jan 2024 01:20 )   PT: 15.6 sec;   INR: 1.38          PTT - ( 23 Jan 2024 01:20 )  PTT:29.1 sec  Urinalysis Basic - ( 23 Jan 2024 04:22 )    Color: Dark Yellow / Appearance: Turbid / SG: >1.030 / pH: x  Gluc: x / Ketone: Negative mg/dL  / Bili: Small / Urobili: 1.0 mg/dL   Blood: x / Protein: 100 mg/dL / Nitrite: Positive   Leuk Esterase: Moderate / RBC: 46 /HPF / WBC 90 /HPF   Sq Epi: x / Non Sq Epi: 2 /HPF / Bacteria: Moderate /HPF              IMAGING/EKG/ETC   ****************************Transfer from MICU to [facility]************************  69-year-old male with PMHx Parkinson's disease, HTN, HLD, depression, pressure ulcers (L hip, medial knees), BPH, renal calculus presented from [facility] for respiratory distress, hypoxia, and hypotension. Pt non-verbal and unable to provide history. Per NH records, patient was transferred to ED for "respiratory distress". Upon arrival to ED, pt placed on NRB and HFNC and BiPAP. S/p cefepime, azithromycin, hydrocortison, decadron in the ED. Upon arrival to MICU pt made comfort measures only, DNR/DNI per family wishes. BiPAP removed and symptomatic medications and managements only.    Subjective/ROS: Patient seen and examined at bedside. Pt intubated and does not awake to sternal rub    VITALS  Vital Signs Last 24 Hrs  T(C): 37.6 (23 Jan 2024 06:22), Max: 37.7 (23 Jan 2024 02:45)  T(F): 99.7 (23 Jan 2024 06:22), Max: 99.9 (23 Jan 2024 02:45)  HR: 114 (23 Jan 2024 06:15) (91 - 122)  BP: 96/54 (23 Jan 2024 06:15) (85/53 - 117/68)  BP(mean): 74 (23 Jan 2024 06:15) (67 - 86)  RR: 38 (23 Jan 2024 06:15) (28 - 38)  SpO2: 93% (23 Jan 2024 06:15) (86% - 100%)    Parameters below as of 23 Jan 2024 06:15  Patient On (Oxygen Delivery Method): BiPAP/CPAP    O2 Concentration (%): 60    CAPILLARY BLOOD GLUCOSE      POCT Blood Glucose.: 87 mg/dL (23 Jan 2024 01:03)      PHYSICAL EXAM  General: cachectic, appears uncomfortble on biPAP  Head: NC/AT; MMM; PERRL; EOMI;  Neck: Supple; no JVD  Respiratory: decreased breath sounds, tachypnic, belly breathing, using accessory muscles  Cardiovascular: tachycardic, Regular rhythm; S1/S2+, no murmurs, rubs gallops   Gastrointestinal: Soft; NTND; bowel sounds normal and present  Extremities: WWP; no edema/cyanosis  Neurological: does not awake to sternal rub  dermatologist: multiple wounds to lower extremities: stage 2 to L heel, medial L foot, L shin, L knee, R foot, R knee, R outer thigh, sacral, and upper back; and 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 1000 milliLiter(s) (100 mL/Hr) IV Continuous <Continuous>  dextrose 5%. 1000 milliLiter(s) (50 mL/Hr) IV Continuous <Continuous>  dextrose 50% Injectable 12.5 Gram(s) IV Push once  dextrose 50% Injectable 25 Gram(s) IV Push once  dextrose 50% Injectable 25 Gram(s) IV Push once  enoxaparin Injectable 40 milliGRAM(s) SubCutaneous every 24 hours  glucagon  Injectable 1 milliGRAM(s) IntraMuscular once  insulin lispro (ADMELOG) corrective regimen sliding scale   SubCutaneous three times a day before meals  lactated ringers. 1000 milliLiter(s) (1000 mL/Hr) IV Continuous <Continuous>  pantoprazole  Injectable 40 milliGRAM(s) IV Push every 24 hours    MEDICATIONS  (PRN):  dextrose Oral Gel 15 Gram(s) Oral once PRN Blood Glucose LESS THAN 70 milliGRAM(s)/deciliter      Allergy Status Unknown      LABS                        12.6   13.75 )-----------( 454      ( 23 Jan 2024 01:20 )             42.2     01-23    156<H>  |  118<H>  |  35<H>  ----------------------------<  130<H>  4.5   |  21<L>  |  0.91    Ca    9.6      23 Jan 2024 01:20    TPro  7.4  /  Alb  2.8<L>  /  TBili  2.0<H>  /  DBili  x   /  AST  94<H>  /  ALT  76<H>  /  AlkPhos  242<H>  01-23    PT/INR - ( 23 Jan 2024 01:20 )   PT: 15.6 sec;   INR: 1.38          PTT - ( 23 Jan 2024 01:20 )  PTT:29.1 sec  Urinalysis Basic - ( 23 Jan 2024 04:22 )    Color: Dark Yellow / Appearance: Turbid / SG: >1.030 / pH: x  Gluc: x / Ketone: Negative mg/dL  / Bili: Small / Urobili: 1.0 mg/dL   Blood: x / Protein: 100 mg/dL / Nitrite: Positive   Leuk Esterase: Moderate / RBC: 46 /HPF / WBC 90 /HPF   Sq Epi: x / Non Sq Epi: 2 /HPF / Bacteria: Moderate /HPF              IMAGING/EKG/ETC

## 2024-01-23 NOTE — H&P ADULT - HISTORY OF PRESENT ILLNESS
69-year-old male with PMHx Parkinson's disease, HTN, HLD, depression, pressure ulcers (L hip, medial knees), BPH, renal calculus presented from [facility] for respiratory distress, hypoxia, and hypotension. Pt non-verbal and unable to provide history. Per NH records, patient was transferred to ED for "respiratory distress". Unable to obtain ROS due mental status.     ED vitals: T99.9, , BP 85/53, RR 28, 86% 15L NRB -> 91% 40/70 HFNC -> bipap 10/5 96%  Labs: WBC 13.7, plt 454, INR 1.38, Na 156, lactate 4.1 <- 7.1, Cl 118, bicarb 21, BUN 35, Tbili 2.0, alk phos 242, AST 94, ALT 76, Trop T 99, BNP 1263, ABG pH 7.46, pCO2 28, pO2 54, UA +LE/nitrite/WBC/bacteria, COVID-19+  CXR: left lower infiltrate  CT angio chest: no PE. Extensive bilateral lower lobe and perihilar infiltrates, bilateral hilar lymphadenopathy. Coronary atherosclerosis.   CT angio AP: Rectum distended with stool and mild rectal wall thickening with edema, possible proctitis. No acute abdominal process.  Intervention: Cefepime 2g IV x1, Azithromycin 500mg IV x1, Hydrocortisone 100mg IVP x1, 3.2L NS IV bolus, 1L LR bolus, Decadron 6mg IV x1  Consults: ICU consulted for sepsis and hypoxia

## 2024-01-23 NOTE — H&P ADULT - NSHPSOCIALHISTORY_GEN_ALL_CORE
Lives at [facility].  Unable to obtain social history due to mental status.

## 2024-01-23 NOTE — PROGRESS NOTE ADULT - ASSESSMENT
69-year-old male with PMHx Parkinson's disease, HTN, HLD, depression, pressure ulcers (L hip, medial knees), BPH, renal calculus presented from [facility] for respiratory distress, hypoxia, and hypotension; found to be in severe sepsis acute hypoxic respiratory failure likely iso COVID and superimposed bacterial PNA, decision made with family to focus on comfort measure.

## 2024-01-23 NOTE — ED ADULT NURSE NOTE - CHIEF COMPLAINT QUOTE
Pt, with hx of HTN, HLD, and parkinsons, presents to ER from [facility] in respiratory distress with O2 saturation in the low 80s and hypotensive 76/51.

## 2024-01-23 NOTE — ED ADULT TRIAGE NOTE - CHIEF COMPLAINT QUOTE
Pt, with hx of HTN, HLD, and parkinsons, presents to ER from [facility] in respiratory distress with O2 saturation in the low 80s and hypotensive 76/51.

## 2024-01-23 NOTE — H&P ADULT - NSHPPHYSICALEXAM_GEN_ALL_CORE
.  VITAL SIGNS:  T(C): 37.6 (01-23-24 @ 06:22), Max: 37.7 (01-23-24 @ 02:45)  T(F): 99.7 (01-23-24 @ 06:22), Max: 99.9 (01-23-24 @ 02:45)  HR: 116 (01-23-24 @ 06:40) (91 - 122)  BP: 96/54 (01-23-24 @ 06:15) (85/53 - 117/68)  BP(mean): 74 (01-23-24 @ 06:15) (67 - 86)  RR: 38 (01-23-24 @ 06:15) (28 - 38)  SpO2: 97% (01-23-24 @ 06:40) (86% - 100%)  Wt(kg): --    PHYSICAL EXAM:    Constitutional: lethargic, nonconversant, opens eyes, on BiPAP   ENT: PERRL; dry MM  Respiratory: tachypneic, decreased breaths sounds bilaterally, no wheezing or rales  Cardiac: tachycardic, regular rate, no murmurs   Gastrointestinal: abdomen soft, NT/ND  Extremities: WWP, no LE edema  Vascular: 2+ radial pulses B/L  Dermatologic: multiple wounds to lower extremities: stage 2 to L heel, medial L foot, L shin, L knee, R foot, R knee, R outer thigh, sacral, and upper back; and 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

## 2024-01-23 NOTE — CONSULT NOTE ADULT - PROBLEM SELECTOR RECOMMENDATION 9
.  In setting of COVID infection and superimposed bacterial pneumonia. RR in 30s on admission.  - Per GOC above, DNI, comfort measures only  - Morphine 4mg IV q1h PRN RR>22 or WOB  - If RR still high after 4mg, would increase morphine dosage to 8mg IV q1h PRN  - Plan to remove BIPAP once patient is breathing more comfortably

## 2024-01-23 NOTE — CONSULT NOTE ADULT - SUBJECTIVE AND OBJECTIVE BOX
Patient is a 69y old  Male who presents with a chief complaint of sepsis, AHRF (23 Jan 2024 06:35)      HPI:  69-year-old male with PMHx Parkinson's disease, HTN, HLD, depression, pressure ulcers (L hip, medial knees), BPH, renal calculus presented from [facility] for respiratory distress, hypoxia, and hypotension. Pt non-verbal and unable to provide history. Per NH records, patient was transferred to ED for "respiratory distress". Unable to obtain ROS due mental status.     ED vitals: T99.9, , BP 85/53, RR 28, 86% 15L NRB -> 91% 40/70 HFNC -> bipap 10/5 96%  Labs: WBC 13.7, plt 454, INR 1.38, Na 156, lactate 4.1 <- 7.1, Cl 118, bicarb 21, BUN 35, Tbili 2.0, alk phos 242, AST 94, ALT 76, Trop T 99, BNP 1263, ABG pH 7.46, pCO2 28, pO2 54, UA +LE/nitrite/WBC/bacteria, COVID-19+  CXR: left lower infiltrate  CT angio chest: no PE. Extensive bilateral lower lobe and perihilar infiltrates, bilateral hilar lymphadenopathy. Coronary atherosclerosis.   CT angio AP: Rectum distended with stool and mild rectal wall thickening with edema, possible proctitis. No acute abdominal process.  Intervention: Cefepime 2g IV x1, Azithromycin 500mg IV x1, Hydrocortisone 100mg IVP x1, 3.2L NS IV bolus, 1L LR bolus, Decadron 6mg IV x1  Consults: ICU consulted for sepsis and hypoxia      Allergies    Allergy Status Unknown    Intolerances      Home Medications:  ascorbic acid 500 mg oral tablet: 1 tab(s) orally once a day for wound care supplement (23 Jan 2024 06:34)  carbidopa-levodopa 25 mg-100 mg oral tablet: 1 tab(s) orally every 8 hours for parkinsons (23 Jan 2024 06:36)  cyanocobalamin 1000 mcg oral tablet: 1 tab(s) orally once a day (23 Jan 2024 06:36)  finasteride 5 mg oral tablet: 1 tab(s) orally once a day for BPH (23 Jan 2024 06:38)  GlycoLax oral powder for reconstitution: 17 gram(s) orally once a day for constipation (23 Jan 2024 06:38)  lactulose 10 g/15 mL oral syrup: 15 milliliter(s) orally every 12 hours for constipation (23 Jan 2024 06:37)  memantine 7 mg oral capsule, extended release: 1 cap(s) orally once a day for dementia (23 Jan 2024 06:39)  Multiple Vitamins oral tablet: 1 tab(s) orally once a day for wound care supplement (23 Jan 2024 06:39)  rivastigmine 9.5 mg/24 hr transdermal film, extended release: 1 patch transdermally every 24 hours for dementia; remove per schedule (23 Jan 2024 06:40)  Senna 8.6 mg oral tablet: 2 tab(s) orally once a day (at bedtime) for constipation (23 Jan 2024 06:41)  Tylenol 325 mg oral tablet: 2 tab(s) orally every 6 hours as needed for  pain (23 Jan 2024 06:33)      SOCIAL HX:     Smoking          ETOH/Illicit drugs          Occupation    PAST MEDICAL & SURGICAL HISTORY:  History of Parkinson's disease      HTN (hypertension)      Hyperlipidemia      History of depression      BPH (benign prostatic hyperplasia)          FAMILY HISTORY:  :    No known cardiovascular or pulmonary family history     ROS:  See HPI     PHYSICAL EXAM    ICU Vital Signs Last 24 Hrs  T(C): 37.6 (23 Jan 2024 06:22), Max: 37.7 (23 Jan 2024 02:45)  T(F): 99.7 (23 Jan 2024 06:22), Max: 99.9 (23 Jan 2024 02:45)  HR: 116 (23 Jan 2024 07:00) (91 - 122)  BP: 91/51 (23 Jan 2024 07:00) (85/53 - 117/68)  BP(mean): 65 (23 Jan 2024 07:00) (65 - 86)  ABP: --  ABP(mean): --  RR: 30 (23 Jan 2024 07:00) (28 - 38)  SpO2: 95% (23 Jan 2024 07:00) (86% - 100%)    O2 Parameters below as of 23 Jan 2024 07:00  Patient On (Oxygen Delivery Method): BiPAP/CPAP    O2 Concentration (%): 80        General: lethargic but arousable on bipap  HEENT:  NADIA  Lungs: Bilateral decreased breath sounds, tachypneic, accessory muscle use  Cardiovascular: Regular rhythm and   Gastrointestinal: Soft, Positive BS  Musculoskeletal: No clubbing.  Moves all extremities.    Skin: numerous skin wounds, stage 2 medial knees and legs, buttock stage 4  Neurological: unable to asssess        LABS:                          10.5   18.32 )-----------( 476      ( 23 Jan 2024 06:15 )             35.9                                               01-23    151<H>  |  119<H>  |  29<H>  ----------------------------<  162<H>  3.9   |  25  |  0.65    Ca    8.2<L>      23 Jan 2024 06:15  Phos  3.9     01-23  Mg     2.0     01-23    TPro  6.4  /  Alb  2.2<L>  /  TBili  1.3<H>  /  DBili  x   /  AST  53<H>  /  ALT  52<H>  /  AlkPhos  176<H>  01-23      PT/INR - ( 23 Jan 2024 01:20 )   PT: 15.6 sec;   INR: 1.38          PTT - ( 23 Jan 2024 01:20 )  PTT:29.1 sec                                       Urinalysis Basic - ( 23 Jan 2024 06:15 )    Color: x / Appearance: x / SG: x / pH: x  Gluc: 162 mg/dL / Ketone: x  / Bili: x / Urobili: x   Blood: x / Protein: x / Nitrite: x   Leuk Esterase: x / RBC: x / WBC x   Sq Epi: x / Non Sq Epi: x / Bacteria: x                                                  LIVER FUNCTIONS - ( 23 Jan 2024 06:15 )  Alb: 2.2 g/dL / Pro: 6.4 g/dL / ALK PHOS: 176 U/L / ALT: 52 U/L / AST: 53 U/L / GGT: x                                                                                                                                       ABG - ( 23 Jan 2024 06:15 )  pH, Arterial: 7.14  pH, Blood: x     /  pCO2: 72    /  pO2: 79    / HCO3: 24    / Base Excess: -6.0  /  SaO2: 94.7                CXR:    ECHO:    MEDICATIONS  (STANDING):  dextrose 5%. 1000 milliLiter(s) (50 mL/Hr) IV Continuous <Continuous>  dextrose 5%. 1000 milliLiter(s) (100 mL/Hr) IV Continuous <Continuous>  dextrose 50% Injectable 12.5 Gram(s) IV Push once  dextrose 50% Injectable 25 Gram(s) IV Push once  dextrose 50% Injectable 25 Gram(s) IV Push once  glucagon  Injectable 1 milliGRAM(s) IntraMuscular once  pantoprazole  Injectable 40 milliGRAM(s) IV Push every 24 hours    MEDICATIONS  (PRN):  dextrose Oral Gel 15 Gram(s) Oral once PRN Blood Glucose LESS THAN 70 milliGRAM(s)/deciliter        
HPI:  69-year-old male with PMHx Parkinson's disease, HTN, HLD, depression, pressure ulcers (L hip, medial knees), BPH, renal calculus presented from [facility] for respiratory distress, hypoxia, and hypotension. Pt non-verbal and unable to provide history. Per NH records, patient was transferred to ED for "respiratory distress". Unable to obtain ROS due mental status.     ED vitals: T99.9, , BP 85/53, RR 28, 86% 15L NRB -> 91% 40/70 HFNC -> bipap 10/5 96%  Labs: WBC 13.7, plt 454, INR 1.38, Na 156, lactate 4.1 <- 7.1, Cl 118, bicarb 21, BUN 35, Tbili 2.0, alk phos 242, AST 94, ALT 76, Trop T 99, BNP 1263, ABG pH 7.46, pCO2 28, pO2 54, UA +LE/nitrite/WBC/bacteria, COVID-19+  CXR: left lower infiltrate  CT angio chest: no PE. Extensive bilateral lower lobe and perihilar infiltrates, bilateral hilar lymphadenopathy. Coronary atherosclerosis.   CT angio AP: Rectum distended with stool and mild rectal wall thickening with edema, possible proctitis. No acute abdominal process.  Intervention: Cefepime 2g IV x1, Azithromycin 500mg IV x1, Hydrocortisone 100mg IVP x1, 3.2L NS IV bolus, 1L LR bolus, Decadron 6mg IV x1  Consults: ICU consulted for sepsis and hypoxia (23 Jan 2024 06:07)    PERTINENT PM/SXH:   History of Parkinson's disease    HTN (hypertension)    Hyperlipidemia    History of depression    BPH (benign prostatic hyperplasia)        FAMILY HISTORY:  No history of dementia in first degree relatives according to chart  Unable to obtain due to patient's encephalopathy    ITEMS NOT CHECKED ARE NOT PRESENT  SOCIAL HISTORY:   Significant other/partner:  []  Children:  []   Substance hx:  []   Tobacco hx:  []   Alcohol hx: []   Home Opioid hx:  [] I-Stop Reference No:  - no active Rx's / see chart note  Living Situation: []Home  []Long term care  [X]Rehab []Other  Druze/Spiritual practice: ; Role of organized Uatsdin [ ] important [ ] some [X] unable to assess  Coping: [] well [] with difficulty [] poor coping [X] unable to assess  Support system: [X] strong [] adequate [] inadequate    ADVANCE DIRECTIVES:    []MOLST  []Living Will  DECISION MAKER(s):  [] Health Care Proxy(s)  [X] Surrogate(s)  [] Guardian           Name(s)/Phone Number(s): sister Elba Mcguire ([phone]), Ade Garcia ([phone])    BASELINE (I)ADLs (prior to admission):  Mathews: []Total  [] Moderate []Dependent    ALLERGIES:  Allergy Status Unknown    MEDICATIONS  (STANDING):  chlorhexidine 2% Cloths 1 Application(s) Topical <User Schedule>    MEDICATIONS  (PRN):  glycopyrrolate Injectable 0.4 milliGRAM(s) IV Push four times a day PRN Secretions  LORazepam   Injectable 0.5 milliGRAM(s) IV Push every 2 hours PRN Agitation  morphine  - Injectable 4 milliGRAM(s) IV Push every 1 hour PRN RR >22 or WOB    PRESENT SYMPTOMS/REVIEW OF SYSTEMS: [X]Unable to obtain due to poor mentation/encephalopathy  Source if other than patient:  []Family   [X]Team     Pain: [] yes [] no  QOL Impact -   Location -                    Aggravating Factors -  Quality -  Radiation -  Timing -  Severity (0-10 scale) -   Minimal Acceptable Level (0-10 scale) -    CPOT Score: 0  (Pain Assessment Scale for Critically Ill)    Dyspnea:                           []Mild  []Moderate []Severe  Anxiety:                             []Mild []Moderate []Severe  Fatigue:                             []Mild []Moderate []Severe  Nausea:                             []Mild []Moderate []Severe  Loss of Appetite:              []Mild []Moderate []Severe  Constipation:                    []Mild []Moderate []Severe    Other Symptoms:  [x]All Other Review Of Systems Negative     Palliative Performance Status Version 2:  10%  (Functional Assessment Tool)    PHYSICAL EXAM:  GENERAL:  []Alert  []Oriented x   []Lethargic  []Cachexia  [X]Unarousable  []Verbal  [X]Non-Verbal  Behavioral:   []Anxiety  []Delirium []Agitation [X]Calm  HEENT:  [X]Normal   []Dry mouth   []ET Tube/Trach  []Oral lesions  PULMONARY:   []Clear [X]Tachypnea  [X]Audible excessive secretions  []Normal Work of Breathing []Labored Breathing  []Rhonchi []Crackles []Wheezing  CARDIOVASCULAR:    [X]Regular Rate & Rhythm []Irregular []Tachy  []Lonny  GASTROINTESTINAL:  [X]Soft  []Distended   []+BS  [X]Non tender []Tender  []PEG []OGT/ NGT  Last BM:  GENITOURINARY:  []Normal [] Incontinent   []Oliguria/Anuria   [X]Gandhi  MUSCULOSKELETAL:   []Normal   []Weakness  [X]Bed/Wheelchair bound []Edema  NEUROLOGIC:   []No focal deficits  [X]Cognitive impairment  []Dysphagia []Dysarthria []Paresis []Encephalopathic  SKIN:   []Normal   [X]Pressure ulcer(s)  []Rash    CRITICAL CARE:  []Shock Present  [X]Septic []Cardiogenic []Neurologic []Hypovolemic  []Vasopressors []Inotropes   [X]Respiratory failure present []Mechanical Ventilation []Non-invasive ventilatory support []High-Flow  []Acute  []Chronic []Hypoxic  []Hypercarbic  []Other organ failure    Vital Signs Last 24 Hrs  T(C): 37.4 (23 Jan 2024 09:11), Max: 37.7 (23 Jan 2024 02:45)  T(F): 99.3 (23 Jan 2024 09:11), Max: 99.9 (23 Jan 2024 02:45)  HR: 111 (23 Jan 2024 10:00) (91 - 122)  BP: 98/64 (23 Jan 2024 10:00) (85/53 - 117/68)  BP(mean): 77 (23 Jan 2024 10:00) (65 - 86)  RR: 30 (23 Jan 2024 10:00) (28 - 38)  SpO2: 97% (23 Jan 2024 10:00) (86% - 100%)    Parameters below as of 23 Jan 2024 10:00  Patient On (Oxygen Delivery Method): BiPAP/CPAP    O2 Concentration (%): 90    LABS:                        10.5   18.32 )-----------( 476      ( 23 Jan 2024 06:15 )             35.9   01-23    151<H>  |  119<H>  |  29<H>  ----------------------------<  162<H>  3.9   |  25  |  0.65    Ca    8.2<L>      23 Jan 2024 06:15  Phos  3.9     01-23  Mg     2.0     01-23    TPro  6.4  /  Alb  2.2<L>  /  TBili  1.3<H>  /  DBili  x   /  AST  53<H>  /  ALT  52<H>  /  AlkPhos  176<H>  01-23    RADIOLOGY & ADDITIONAL STUDIES:  Respiratory Viral Panel with COVID-19 by CRISTY (01.23.24 @ 01:13)   Rapid RVP Result: Detected  SARS-CoV-2: Detected:< from: Xray Chest 1 View-PORTABLE IMMEDIATE (01.23.24 @ 01:57) >  PROCEDURE DATE:  01/23/2024          INTERPRETATION:  PORTABLE CHEST X-RAY    HISTORY: Sepsis    PRIOR STUDIES: No prior studies are available for comparison.    FINDINGS: The heart is probably not enlarged. No pleural effusion or   pneumothorax. Patchy consolidation/opacification left mid and lower zone   as well as the right base. OA right glenohumeral joint.    IMPRESSION:  Bilateral lung  opacification/consolidation.    --- End of Report ---    < end of copied text >    REFERRALS:  [x]Social Work  []Case management []PT/OT []Chaplaincy  []Hospice  [X]Patient/Family Support []Massage Therapy []Music Therapy    DISCUSSION OF CASE: Family - obtained additional history and to provide emotional support;  Primary team - discussed plan of care

## 2024-01-23 NOTE — ED PROVIDER NOTE - CARE PLAN
1 Principal Discharge DX:	Pneumonia due to COVID-19 virus  Secondary Diagnosis:	Acute hypoxic respiratory failure

## 2024-01-23 NOTE — ED PROVIDER NOTE - CRITICAL CARE ATTENDING CONTRIBUTION TO CARE
Critical Care Procedure Note  Authorized and Performed by: MD Name  Total critical care time: Approximately 45 minutes  Due to a high probability of clinically significant, life threatening deterioration, the patient required my highest level of preparedness to intervene emergently and I personally spent this critical care time directly and personally managing the patient. This critical care time included obtaining a history; examining the patient; pulse oximetry; ordering and review of studies; arranging urgent treatment with development of a management plan; evaluation of patient's response to treatment; frequent reassessment; and, discussions with other providers.  This critical care time was performed to assess and manage the high probability of imminent, life-threatening deterioration that could result in multi-organ failure. It was exclusive of separately billable procedures and treating other patients and teaching time.  Please see MDM section and the rest of the note for further information on patient assessment and treatment.

## 2024-01-23 NOTE — ED PROVIDER NOTE - CLINICAL SUMMARY MEDICAL DECISION MAKING FREE TEXT BOX
69-year-old male appears much older than stated age with contractures dementia here for hypoxia and hypotension will evaluate for aspiration pneumonia COVID sepsis heart failure COPD type exacerbation pneumothorax   POCUS echo shows hyperdynamic left ventricle with preserved ejection fraction collapsible IVC no B-lines lung sliding bilaterally no RV strain no pericardial effusion     will treat with fluid resuscitation stress dose steroids, patient is arousable on my exam and protecting his airway although he is not mild to moderate respiratory distress and tachypneic does not have mental status initially for BiPAP will place on high flow monitor closely for need for intubation plan for VBG/ABG     VBG/ABG shows respiratory alkalosis with hypoxia patient was maxed out on high flow nasal cannula initial ABG was done on 40/40 titrated up and oxygen came up to 93% patient more awake and alert eyes open moving all extremities significantly decreased work of breathing     now has mental status for BiPAP and blood pressure improved 110 over/70 heart rate improving to 105 bpm     initial EKG was sinus tachycardia 113 to motion artifact LVH normal axis no STEMI no acute ischemia     ICU consulted hemoglobin within normal limits no white count lactate 7   given that patient has an elevated lactate hypoxic and tender abdomen will plan for CTA abdomen to evaluate for ischemic bowel CT chest with contrast/CTA with contrast to evaluate for aspiration pneumonia versus PE   low threshold for intubation we will trial BiPAP and consider pressors as needed

## 2024-01-23 NOTE — ED PROVIDER NOTE - PROGRESS NOTE DETAILS
Patient found to have COVID-pneumonia Case discussed with ICU will admit to ICU steroids given antibiotics given patient stabilized Gandhi placed by urology with good output Patient found to have COVID-pneumonia, Case discussed with ICU will admit to ICU steroids given antibiotics given patient stabilized Gandhi placed by urology with good output

## 2024-01-23 NOTE — PATIENT PROFILE ADULT - FALL HARM RISK - HARM RISK INTERVENTIONS

## 2024-01-23 NOTE — ED PROVIDER NOTE - BIRTH SEX
It[facility] 75  coding opportunities     I11 0     Chart Reviewed number of suggestions sent to Provider: 1     Patients Insurance     Medicare Insurance: [employer] Male

## 2024-01-23 NOTE — ED ADULT NURSE NOTE - NS ED NURSE TRANSPORT WITH
Orders Placed This Encounter   Procedures    Wound cleansing and dressings Diabetic Ulcer Left Heel     Wound cleansed with normal saline.  Dakins soak applied.  Covered with dry sterile dressing.  Reinforced with angelo and tape.  Heel offloading shoe applied for pressure relief.    Patient to be admitted to hospital.     Standing Status:   Future     Standing Expiration Date:   1/18/2025    Transfer to other facility     Order Specific Question:   Call back Phone Number:     Answer:   [phone]     Order Specific Question:   Request Type:     Answer:   Transfer to [facility] Facility [[id number]]     Order Specific Question:   Hospital Area:     Answer:   [facility] [[id number]]     Order Specific Question:   Reason for Transfer:     Answer:   Direct Admission [18]     Order Specific Question:   Service:     Answer:   Podiatry [[id number]]     Order Specific Question:   Level of Care:     Answer:   Med Surg [16]        Cardiac Monitor/Defib/ACLS/Rescue Kit/O2/BVM/bipap/ACLS Rescue Kit

## 2024-01-23 NOTE — H&P ADULT - NSHPLABSRESULTS_GEN_ALL_CORE
12.6   13.75 )-----------( 454      ( 23 Jan 2024 01:20 )             42.2       01-23    156<H>  |  118<H>  |  35<H>  ----------------------------<  130<H>  4.5   |  21<L>  |  0.91    Ca    9.6      23 Jan 2024 01:20    TPro  7.4  /  Alb  2.8<L>  /  TBili  2.0<H>  /  DBili  x   /  AST  94<H>  /  ALT  76<H>  /  AlkPhos  242<H>  01-23          ABG - ( 23 Jan 2024 01:26 )  pH, Arterial: 7.46  pH, Blood: x     /  pCO2: 28    /  pO2: 54    / HCO3: 20    / Base Excess: -2.6  /  SaO2: 86.9                Urinalysis Basic - ( 23 Jan 2024 04:22 )    Color: Dark Yellow / Appearance: Turbid / SG: >1.030 / pH: x  Gluc: x / Ketone: Negative mg/dL  / Bili: Small / Urobili: 1.0 mg/dL   Blood: x / Protein: 100 mg/dL / Nitrite: Positive   Leuk Esterase: Moderate / RBC: 46 /HPF / WBC 90 /HPF   Sq Epi: x / Non Sq Epi: 2 /HPF / Bacteria: Moderate /HPF        PT/INR - ( 23 Jan 2024 01:20 )   PT: 15.6 sec;   INR: 1.38          PTT - ( 23 Jan 2024 01:20 )  PTT:29.1 sec    Lactate Trend  01-23 @ 03:45 Lactate:4.1   01-23 @ 01:20 Lactate:7.1             CAPILLARY BLOOD GLUCOSE      POCT Blood Glucose.: 137 mg/dL (23 Jan 2024 06:35)

## 2024-01-23 NOTE — H&P ADULT - ASSESSMENT
69-year-old male with PMHx Parkinson's disease, HTN, HLD, depression, pressure ulcers (L hip, medial knees), BPH, renal calculus presented from [facility] for respiratory distress, hypoxia, and hypotension; found to be in severe sepsis acute hypoxic respiratory failure likely iso COVID and superimposed PNA.       1. Acute hypoxic respiratory failure  3. Bilateral PNA (superimposed bacterial vs. covid)  4. Severe sepsis (with lactate)  6. Elevated BNP  7. HAGMA due to lactic acidosis   8. Hypernatremia    NEURO  #AMS  #Parkinson's disease  Pt with Parkinson's disease presenting with lethargy. Likely iso sepsis. On home Carbidopa-Levodopa 25-100mg TID, Memantine 7mg qd and Rivastigmine transdermal patch qd.   - Tx sepsis as below  - C/w home meds       PULM  #AHRF  2/2 bacterial PNA, superimposed.  - tx PNA (cefepime, azithro)  - urine strep, legionella, MRSA  - procalcitonin  - high curb-65  - repeat abg    #COVID  - steroids  - holding remdesivir iso LFTs    #ARDS?    Cards  goal MAP >65    #sinus tachy iso sepsis    #severe sepsis iso PNA and UTI    GI  #GI ppx  Protonix while on steroids    #Transaminitis     #NPO    #high ALP  - CTAP  - ?RUQ US    Renal  - Strict I&O's      #BPH    Endo  - ISS while on steroids    DVT ppx: Lovenox 69-year-old male with PMHx Parkinson's disease, HTN, HLD, depression, pressure ulcers (L hip, medial knees), BPH, renal calculus presented from [facility] home for respiratory distress, hypoxia, and hypotension; found to be in severe sepsis acute hypoxic respiratory failure likely iso COVID and superimposed PNA.       1. Acute hypoxic respiratory failure  3. Bilateral PNA (superimposed bacterial vs. covid)  4. Severe sepsis (with lactate)  6. Elevated BNP  7. HAGMA due to lactic acidosis   8. Hypernatremia    NEURO  #AMS  #Parkinson's disease  Pt with Parkinson's disease presenting with lethargy. Likely iso sepsis. On home Carbidopa-Levodopa 25-100mg TID, Memantine 7mg qd and Rivastigmine transdermal patch qd.   - Tx sepsis as below  - C/w home meds       PULM  #Acute hypoxic respiratory failure   Pt presented from [facility] with hypoxia, likely iso of COVID and superimposed bacterial PNA. Initially in the ED, satting 86% at 15L nonrebreather mask then switched to HFNC 40/70, satting 91%, however persisted to be tachypneic -> placed on BiPAP.   - Continue with BiPAP  - Continue to monitor respiratory status, might need intubation    - Treat COVID and PNA as below     #PNA  Pt p/w superimposed bacterial PNA.   - urine strep, legionella, MRSA  - procalcitonin  - high curb-65  - repeat abg    #COVID  - steroids  - holding remdesivir iso LFTs    #ARDS?    Cards  goal MAP >65    #sinus tachy iso sepsis    #severe sepsis iso PNA and UTI    GI  #GI ppx  Protonix while on steroids    #Transaminitis     #NPO    #high ALP  - CTAP  - ?RUQ US    Renal  - Strict I&O's      #BPH    Endo  - ISS while on steroids    DVT ppx: Lovenox 69-year-old male with PMHx Parkinson's disease, HTN, HLD, depression, pressure ulcers (L hip, medial knees), BPH, renal calculus presented from [facility] home for respiratory distress, hypoxia, and hypotension; found to be in severe sepsis acute hypoxic respiratory failure likely iso COVID and superimposed bacterial PNA.     NEURO  #AMS  #Parkinson's disease  Pt with Parkinson's disease presenting with lethargy. Likely iso sepsis. On home Carbidopa-Levodopa 25-100mg TID, Memantine 7mg qd and Rivastigmine transdermal patch qd.   - Tx sepsis as below  - C/w home meds once NGT placed      PULM  #Acute hypoxic respiratory failure   #ARDS, severe  Pt presented from [facility] with hypoxia, likely iso of COVID and superimposed bacterial PNA. Initially in the ED, satting 86% at 15L nonrebreather mask then switched to HFNC 40/70, satting 91%, however persisted to be tachypneic -> placed on BiPAP. P/F ratio 99mmHg.  - Continue with BiPAP  - Continue to monitor respiratory status, might need intubation    - Consider proning to medically optimize respiratory status  - Treat COVID and PNA as below     #PNA, bilateral  Pt p/w superimposed bacterial PNA. CXR: left lower-mid infiltrate. CT angio chest: no PE. Extensive bilateral lower lobe and perihilar infiltrates, bilateral hilar lymphadenopathy. Procalcitonin elevated at 1.86. Given Cefepime 2g IV x1 and azithromycin 500mg IV x1 in the ED. CURB-65 score of 5, highest risk, requiring ICU admission.   - F/u urine strep, legionella, MRSA, sputum cx  - C/w Cefepime 2g IV qd (1/23- )  - C/w Azithromycin 500mg IV qd (1/23- )    #COVID  Pt found to be (+) for COVID. S/p Decadron 6mg IV x1 in the ED.   - C/w Decadron 6mg IV qd x10 days (1/23- )  - Holding remdesivir iso LFTs      CARDS  Goal MAP >65    #Sinus tachycardia  Likely iso sepsis.   - Treat sepsis as below       GI  #GI ppx  - C/w Protonix 40mg IV qd while on steroids    #Transaminitis   Presented with elevated LFTs, AST 94, ALT 76, Tbili 2, . CTAP with no mass on liver; contracted gallbladder; unremarkable pancreas.   - Continue to trend  - Consider RUQ if persist/worsen    #Constipation   Pt with constipation, on home Miralax qd, senna 2 tabs qhs, lactulose 15cc BID. CT angio AP: Rectum distended with stool and mild rectal wall thickening with edema, possible proctitis. No acute abdominal process.  - Start home meds once NGT placed     ID  #Severe sepsis  Pt presented with severe sepsis (, RR 28, lactate 7.1). Likely multifactorial iso COVID, superimposed bacterial PNA and UTI. S/p 3.2L NS and 1L LR with hydrocortisone 100mg IVP x1.   - F/u bcx, ucx, sputum cx  - Tx infectious etiology as below     #PNA, bilateral  Pt p/w superimposed bacterial PNA. CXR: left lower-mid infiltrate. CT angio chest: no PE. Extensive bilateral lower lobe and perihilar infiltrates, bilateral hilar lymphadenopathy. Procalcitonin elevated at 1.86. Given Cefepime 2g IV x1 and azithromycin 500mg IV x1 in the ED. CURB-65 score of 5, highest risk, requiring ICU admission.   - F/u urine strep, legionella, MRSA, sputum cx  - C/w Cefepime 2g IV qd (1/23- )  - C/w Azithromycin 500mg IV qd (1/23- )    #COVID  Pt found to be (+) for COVID. S/p Decadron 6mg IV x1 in the ED.   - C/w Decadron 6mg IV qd x10 days (1/23- )  - Holding remdesivir iso LFTs    #UTI  Found to have UA (+) LE/nitrite/WBC/bacteria. Unable to assess for symptoms due to pt's mental status.  - F/u ucx   - Tx with abx as above      RENAL  #Hypernatremia  Pt p/w Na 156 in the ED. Per nursing home chart, pt was started on D5+1/2NS for 3 days. S/p 3.2L of NS bolus and 1L LR bolus in the ED.   - F/u urine studies  - Strict I&O's     #HAGMA with respiratory acidosis  Pt p/w bicarb 21, AG 17, VBG pH 7.26, pCO2 56, lactate 7.1. HAGMA likely due to lactic acidosis. Placed initially on HFNC then BiPAP. Repeat ABG with pH 7.14, pCO2 72; lactate cleared.  - BMP and ABG  - Continue tx for AHRF as above         #BPH  Pt with BPH, on home Finasteride 5mg qd.  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  - Continue with wound care      PROPHYLACTIC MEASURES:  F: S/p 3.2L NS and 1L LR  E: Replete as needed  D: NPO  DVT ppx: Lovenox 40mg SQ qd  GI ppx: Protonix 40mg IV qd  Dispo: MICU

## 2024-01-23 NOTE — PROGRESS NOTE ADULT - ATTENDING COMMENTS
69M w advanced Parkinson's dementia, HTN, HLD, Depression, pressure ulcers on L hip and medial knees, BPH p/w UES w dyspnea, found to have acute hypoxemic respiratory failure, hypotension and severe sepsis POA due to COVID19, b/l pneumonia - suspect aspiration, admitted intially to MICU 1/23 but after further [facility] discussion, decision made to pursue comfort measures - now transferred to 7W    #Severe sepsis POA - WBC 13 on admission. Lactate 7.   #Acute hypoxemic respiratory failure d/t b/l pna - suspect aspiration i/s/o Parkinson's dementia  #Hypotension  #GREGORIA - baseline unclear. Cr admission 0.91 now 0.65  #Hypernatremia  #COVID19    Plan  Pt appearing comfortable. nml resp effort, wo wheezing, rhonchi. Unable to participate in ROS d/t mental status. Still withdrawing to tactile stimuli in BLE.  per [facility] discussion -- pt is DNR/DNI, no pressors -- appreciate Palliative input  PRN Glycopyrrolate for secretions; PRN lorazepam 0.5mg IV q2h for agitation  PRN Morphine 4mg IV q1h for RR>22 or WOB    DNR/DNI - comfort measures only  Above d/w housestaff - Dr. Jameson

## 2024-01-23 NOTE — PROGRESS NOTE ADULT - SUBJECTIVE AND OBJECTIVE BOX
[facility]    Hospital Course:  69-year-old male with PMHx Parkinson's disease, HTN, HLD, depression, pressure ulcers (L hip, medial knees), BPH, renal calculus presented from [facility] for respiratory distress, hypoxia, and hypotension. Pt non-verbal and unable to provide history. Per NH records, patient was transferred to ED for "respiratory distress". Upon arrival to ED, pt placed on NRB and HFNC and BiPAP. S/p cefepime, azithromycin, hydrocortison, decadron in the ED. Upon arrival to MICU pt made comfort measures only, DNR/DNI per family wishes. BiPAP removed and symptomatic medications and managements only. [facility] plan for further escalation of care.     SUBJECTIVE / INTERVAL HPI: Patient seen and examined at bedside.    VITAL SIGNS:  Vital Signs Last 24 Hrs  T(C): 38.4 (23 Jan 2024 13:50), Max: 38.4 (23 Jan 2024 13:50)  T(F): 101.1 (23 Jan 2024 13:50), Max: 101.1 (23 Jan 2024 13:50)  HR: 96 (23 Jan 2024 16:00) (91 - 122)  BP: 83/52 (23 Jan 2024 16:00) (80/51 - 117/68)  BP(mean): 62 (23 Jan 2024 16:00) (60 - 86)  RR: 18 (23 Jan 2024 16:00) (15 - 38)  SpO2: 83% (23 Jan 2024 16:00) (73% - 100%)    Parameters below as of 23 Jan 2024 16:00  Patient On (Oxygen Delivery Method): room air        PHYSICAL EXAM:    General: WDWN  HEENT: NC/AT; PERRL, anicteric sclera; MMM  Neck: supple  Cardiovascular: +S1/S2; RRR  Respiratory: CTA B/L; no W/R/R  Gastrointestinal: soft, NT/ND; +BSx4  Extremities: WWP; no edema, clubbing or cyanosis  Vascular: 2+ radial, DP/PT pulses B/L  Neurological: AAOx3; no focal deficits    MEDICATIONS:  MEDICATIONS  (STANDING):  chlorhexidine 2% Cloths 1 Application(s) Topical <User Schedule>    MEDICATIONS  (PRN):  glycopyrrolate Injectable 0.4 milliGRAM(s) IV Push four times a day PRN Secretions  LORazepam   Injectable 0.5 milliGRAM(s) IV Push every 2 hours PRN Agitation  morphine  - Injectable 4 milliGRAM(s) IV Push every 1 hour PRN RR >22 or WOB      ALLERGIES:  Allergies    Allergy Status Unknown    Intolerances        LABS:                        10.5   18.32 )-----------( 476      ( 23 Jan 2024 06:15 )             35.9     01-23    151<H>  |  119<H>  |  29<H>  ----------------------------<  162<H>  3.9   |  25  |  0.65    Ca    8.2<L>      23 Jan 2024 06:15  Phos  3.9     01-23  Mg     2.0     01-23    TPro  6.4  /  Alb  2.2<L>  /  TBili  1.3<H>  /  DBili  x   /  AST  53<H>  /  ALT  52<H>  /  AlkPhos  176<H>  01-23    PT/INR - ( 23 Jan 2024 01:20 )   PT: 15.6 sec;   INR: 1.38          PTT - ( 23 Jan 2024 01:20 )  PTT:29.1 sec  Urinalysis Basic - ( 23 Jan 2024 06:15 )    Color: x / Appearance: x / SG: x / pH: x  Gluc: 162 mg/dL / Ketone: x  / Bili: x / Urobili: x   Blood: x / Protein: x / Nitrite: x   Leuk Esterase: x / RBC: x / WBC x   Sq Epi: x / Non Sq Epi: x / Bacteria: x      CAPILLARY BLOOD GLUCOSE      POCT Blood Glucose.: 137 mg/dL (23 Jan 2024 06:35)      RADIOLOGY & ADDITIONAL TESTS: Reviewed.   [facility]    Hospital Course:  69-year-old male with PMHx Parkinson's disease, HTN, HLD, depression, pressure ulcers (L hip, medial knees), BPH, renal calculus presented from [facility] for respiratory distress, hypoxia, and hypotension. Pt non-verbal and unable to provide history. Per NH records, patient was transferred to ED for "respiratory distress". Upon arrival to ED, pt placed on NRB and HFNC and BiPAP. S/p cefepime, azithromycin, hydrocortison, decadron in the ED. Upon arrival to MICU pt made comfort measures only, DNR/DNI per family wishes. BiPAP removed and symptomatic medications and managements only. [facility] plan for further escalation of care.     SUBJECTIVE / INTERVAL HPI: Patient seen and examined at bedside. Patient minimally responsive to physical stimuli and not verbal stimuli.     VITAL SIGNS:  Vital Signs Last 24 Hrs  T(C): 38.4 (23 Jan 2024 13:50), Max: 38.4 (23 Jan 2024 13:50)  T(F): 101.1 (23 Jan 2024 13:50), Max: 101.1 (23 Jan 2024 13:50)  HR: 96 (23 Jan 2024 16:00) (91 - 122)  BP: 83/52 (23 Jan 2024 16:00) (80/51 - 117/68)  BP(mean): 62 (23 Jan 2024 16:00) (60 - 86)  RR: 18 (23 Jan 2024 16:00) (15 - 38)  SpO2: 83% (23 Jan 2024 16:00) (73% - 100%)    Parameters below as of 23 Jan 2024 16:00  Patient On (Oxygen Delivery Method): room air        PHYSICAL EXAM:    General: WDWN  Neck: supple  Gastrointestinal: soft,  Extremities: slightly cool   Neurological: minimnally responsive to physical stimuli but not verbal stimuli     MEDICATIONS:  MEDICATIONS  (STANDING):  chlorhexidine 2% Cloths 1 Application(s) Topical <User Schedule>    MEDICATIONS  (PRN):  glycopyrrolate Injectable 0.4 milliGRAM(s) IV Push four times a day PRN Secretions  LORazepam   Injectable 0.5 milliGRAM(s) IV Push every 2 hours PRN Agitation  morphine  - Injectable 4 milliGRAM(s) IV Push every 1 hour PRN RR >22 or WOB      ALLERGIES:  Allergies    Allergy Status Unknown    Intolerances        LABS:                        10.5   18.32 )-----------( 476      ( 23 Jan 2024 06:15 )             35.9     01-23    151<H>  |  119<H>  |  29<H>  ----------------------------<  162<H>  3.9   |  25  |  0.65    Ca    8.2<L>      23 Jan 2024 06:15  Phos  3.9     01-23  Mg     2.0     01-23    TPro  6.4  /  Alb  2.2<L>  /  TBili  1.3<H>  /  DBili  x   /  AST  53<H>  /  ALT  52<H>  /  AlkPhos  176<H>  01-23    PT/INR - ( 23 Jan 2024 01:20 )   PT: 15.6 sec;   INR: 1.38          PTT - ( 23 Jan 2024 01:20 )  PTT:29.1 sec  Urinalysis Basic - ( 23 Jan 2024 06:15 )    Color: x / Appearance: x / SG: x / pH: x  Gluc: 162 mg/dL / Ketone: x  / Bili: x / Urobili: x   Blood: x / Protein: x / Nitrite: x   Leuk Esterase: x / RBC: x / WBC x   Sq Epi: x / Non Sq Epi: x / Bacteria: x      CAPILLARY BLOOD GLUCOSE      POCT Blood Glucose.: 137 mg/dL (23 Jan 2024 06:35)      RADIOLOGY & ADDITIONAL TESTS: Reviewed.

## 2024-01-23 NOTE — ED PROVIDER NOTE - NS_BEDUNITTYPES_ED_ALL_ED
Your Child's Health  Two-Month-Old Visit                                                                    Maye Cee  2022    Visit Vitals  Pulse 140   Temp 98.6 °F (37 °C) (Temporal)   Resp 44   Ht 22.75\" (57.8 cm)   Wt 4.593 kg (10 lb 2 oz)   HC 38.5 cm (15.16\")   BMI 13.75 kg/m²     Weight: 10.13 lbs      Your Child's 2 Month-Old Visit                                                       Key points at this age…    • Car seat safety is critical! Make sure your baby is safely and properly riding in a rear-facing car seat.    • Your baby continues to be safest when sleeping on their back in their own bed (a crib, bassinette or Pack-n-Play) at this age.     • It’s normal to have periods of feeling exhausted and overwhelmed with a baby this age. Make sure to ask for help if you experiencing this sort of thing.     NUTRITION: If you are breastfeeding, keep it up! Breast milk or formula will provide all the nutrition your baby needs at this age, except for vitamin D. You should continue to give your baby a vitamin supplement (either a multivitamin or just pure vitamin D) every day if you are breastfeeding. (If you are feeding your baby formula at this age, they are likely getting enough vitamin D from the formula; supplementation with vitamin D is not necessary once babies are taking about 30 ounces of formula daily.)     DEVELOPMENT:  Most babies at this age will smile responsively and  and babble back at you when you talk to them. They will discover their hands and develop stronger neck muscles (which is why tummy time is still important--give your baby tummy time every day!). Talk, read and sing to your baby--hearing your voice often is very beneficial for your child's development (even at this young age!).     EMOTIONAL HEALTH:  Having a  is a joyous and exhausting experience. It is normal to be tired, but be aware that sometimes new moms can experience a true post-partum depression.  This is different from “baby blues” which describes moms who are feeling overwhelmed and fatigued for short periods of time. These feelings shouldn’t last for a long time, and these moms generally know that they love their baby and are looking forward to watching them grow. Post-partum depression, on the other hand, describes moms who are not just exhausted but feel hopeless about the future and are not excited about caring for their baby (and maybe not even able to fully care for their baby). Talk to your doctor if think you are experiencing more than just some “baby blues”. It can be helped, and if it goes on too long, it can have negative effects on you and your baby.     Try to find time to spend alone with your partner, even if it is brief. Likewise, try to spend some brief one-on-one time with your other children each day. Try to find simple, safe tasks that they can do to feel like they are helping.     Having regular routines (as much as possible) for feeding, bathing, etc. may help your household run a little more smoothly and be a little less stressful.     CAR SAFETY: Maye should be secured in a rear-facing infant car safety seat whenever riding in the car for the next several months (until they outgrow the upper weight limits of the seat). This will protect your baby in case of an accident, plus it is the law. If you are shopping for a new car seat or just want to learn more about them, www.safercar.gov is a helpful website, as is the [place] DOT website (search for “car seats”). You can also search for local inspection stations at www.safercar.org or http://www.MeilleurMobile.com or at www.safekidswi.org. Studies show that car seats are installed incorrectly more often than they are installed correctly, so utilizing a certified car seat  can ensure your baby is as safely secured as possible. Remember that the seat straps need to be very snug to protect your baby in case of an accident (so no thick  coats or snowsuits while riding in the car during the winter!).     SAFE SLEEPING: The safest place for a baby to sleep at this age is in their own crib in their parent’s room. Maye should always be placed on their back to sleep (not on their tummy or their side). This “back to sleep” position decreases the risk of crib death (SIDS). Continue to avoid loose, soft bedding (blankets, comforters, sheepskins, quilts, pillows, pillow-like bumper pads) and soft toys in the baby’s crib because they are a risk for suffocation (and SIDS). “Sleep sacks” and swaddling with thin blankets are okay. Cribs (including Pack-n-Plays) should be certified by [facility] (a safety agency for baby products). Safety criteria for cribs include: slats or bars no more than 2-3/8 inches (6cm) apart, a snug fitting mattress, and a distance of no less than 26 inches from the mattress to the top rail. If your crib has a drop-down side rail, it should always be kept all the way up and locked in place.     If you are frustrated with or worried about your baby’s sleeping, the website “The Period of PURPLE Crying” (search “purple crying”) has helpful (and reassuring) information about infant sleep and crying patterns.    PACIFIER USE: Pacifier use during sleep offers some protection against crib death (SIDS). There are safety standards for pacifiers; you can check at saferproducts.gov or kidsindanger.org to make sure the pacifier you choose has not had any safety recalls. Clean the pacifier often, and never coat it with anything sweet (honey or sugar). Offer it to your baby as they are going off to sleep, and if your child spits it out, don’t put it back in their mouth. And never attach it with a cord or string around your baby’s neck or wrist--this can cause dangerous strangulation injuries.     SAFETY:  1.   Falls:  Babies can start rolling at this age, so be careful not to leave Maye alone on a surface they could roll off of. (If you have to walk away  or put your baby down suddenly, the safest place might be on the floor!)     2.   Scalding:  Adjust your hot-water heater to 120-125°F, or use the “low” setting. This will decrease the risk of scald burns (and save money on your utility bills!). It is also important to not carry anything hot (hot drinks, soup, pans) when holding your baby. As Maye is getting more active, they are more likely to grab at what you are holding and cause a (dangerous!) spill.     3.   Walkers, Swings and Other Things:  Do not plan to buy a walker for your baby. They cause a lot of accidents, and they do not help your baby walk any sooner. Other options (stationary \"saucers\", bouncers or jumpers) may be a better choice when your baby is older. Swings may be appropriate for your baby at this age if you choose one for which you baby meets the recommended size (weight, length) criteria. You should always follow the  guidelines for use and never leave your baby unsupervised in a swing. Also, swings are NOT considered a safe sleeping place for babies.     4.   Toys:  Choose toys that have been approved for Maye’s age and that encourage the development of appropriate motor skills (reaching, batting, grabbing). Make sure that other children in the home are not offering the baby their toys which may be inappropriate (e.g., small pieces that could cause choking, balloons, plastic bags).     5. Water Safety: Don't ever leave your baby alone in a bath or pool, even if they are secured in a seat.     6.   No smoking! Exposure to tobacco smoke puts children at risk for lots of problems (asthma and other severe breathing problems, crib death [SIDS], ear infections and even behavior and learning problems). Consider talking to your doctor about quitting smoking. If you can’t quit, keep all smoking outside the home (not just in another room), and all smokers should change their clothes and wash their hands before handling the baby.       & BABYSITTING: Returning to work is a reality for many moms. If you need some guidance choosing a , the [place] Department of Children and Families offers tips to help you choose a childcare center. Go to their website and look under “Family Resources” for information.     If you are not planning a return to work, you still may feel ready for a break sometime. When preparing to leave your baby with a sitter, be sure to let them know where you will be going and leave a working phone number where you can be reached.  Make sure that whoever you are leaving your child with knows how to contact emergency resources if needed (911, Poison Control 1-864.327.3087). The [employer] offers training courses to prepare babysitters to provide safe and responsible care for children.     MEDICATION FOR FEVER OR PAIN:   Acetaminophen liquid (e.g., Tylenol or Tempra or store brands) may be given as often every four hours if needed for pain or fever. (Remember this is ONLY a pain and fever medicine. It will not help cold symptoms, congestion, cough, etc.--ONLY give it for pain or fever.) (Ibuprofen should not be given until 6 months of age.)    Child’s Weight: Dose:  06 - 11 pounds:   40 mg (1.25 mL which is the same as 1/4 teaspoon)  12 - 17 pounds:   80 mg (2.5 mL which is the same as 1/2 teaspoon)    If Maye is outside these weight ranges, call the office for dosing advice.    (Any acetaminophen product--whether it is labelled as for “INFANTS” or “CHILDREN”-- should have a concentration of 160mg/5ml. Check the label to make sure the product you are using has 160mg/5ml; if it does not, call the clinic for dosing instructions.)      Most Recent Immunizations   Administered Date(s) Administered   • Hep B, adolescent or pediatric 2022   Pended Date(s) Pended   • DTaP/Hep B/IPV 2022   • Hib (PRP-OMP) 2022   • Pneumococcal Conjugate 13 Valent Vacc (Prevnar 13) 2022   • Rotavirus - pentavalent  2022         If Maye received any immunizations today and they develop any of the following reactions within 72 hours after an immunization, call our clinic or the after hours pediatric phone nurse:  1.   A temperature of 105 degrees or above.  2.   More than 3 hours of continuous crying.  3.   A shrill, high-pitched cry.  4.   A seizure or a fainting spell.  In this case, you should call 911 or proceed to the emergency room.    NEXT VISIT:  FOUR MONTHS OF AGE      Thank you for entrusting your care to [facility]!    Also, check out “Children’s Health” on the [facility] Blog for updates on timely topics regarding children’s health   ICU

## 2024-01-23 NOTE — CONSULT NOTE ADULT - ASSESSMENT
69-year-old male with PMHx Parkinson's disease, HTN, HLD, depression, pressure ulcers (L hip, medial knees), BPH, renal calculus presented from [facility] nursing home for respiratory distress, hypoxia, and hypotension; found to be in severe sepsis acute hypoxic respiratory failure likely iso COVID and superimposed bacterial PNA.     NEURO  #AMS  #Parkinson's disease  Pt with Parkinson's disease presenting with lethargy. Likely iso sepsis. On home Carbidopa-Levodopa 25-100mg TID, Memantine 7mg qd and Rivastigmine transdermal patch qd.   - Tx sepsis as below  - C/w home meds once NGT placed      PULM  #Acute hypoxic respiratory failure   #ARDS, severe  Pt presented from [facility] with hypoxia, likely iso of COVID and superimposed bacterial PNA. Initially in the ED, satting 86% at 15L nonrebreather mask then switched to HFNC 40/70, satting 91%, however persisted to be tachypneic -> placed on BiPAP. P/F ratio 99mmHg.  - Continue with BiPAP  - Continue to monitor respiratory status, might need intubation    - Consider proning to medically optimize respiratory status  - Treat COVID and PNA as below   - GOC with sisters including intubation given tenuous respiratory status    #PNA, bilateral  Pt p/w superimposed bacterial PNA. CXR: left lower-mid infiltrate. CT angio chest: no PE. Extensive bilateral lower lobe and perihilar infiltrates, bilateral hilar lymphadenopathy. Procalcitonin elevated at 1.86. Given Cefepime 2g IV x1 and azithromycin 500mg IV x1 in the ED. CURB-65 score of 5, highest risk, requiring ICU admission.   - F/u urine strep, legionella, MRSA, sputum cx  - C/w Cefepime 2g IV qd (1/23- )  - C/w Azithromycin 500mg IV qd (1/23- )    #COVID  Pt found to be (+) for COVID. S/p Decadron 6mg IV x1 in the ED.   - C/w Decadron 6mg IV qd x10 days (1/23- )  - Holding remdesivir iso LFTs      CARDS  Goal MAP >65    #Sinus tachycardia  Likely iso sepsis.   - Treat sepsis as below       GI  #GI ppx  - C/w Protonix 40mg IV qd while on steroids    #Transaminitis   Presented with elevated LFTs, AST 94, ALT 76, Tbili 2, . CTAP with no mass on liver; contracted gallbladder; unremarkable pancreas.   - Continue to trend  - Consider RUQ if persist/worsen    #Constipation   Pt with constipation, on home Miralax qd, senna 2 tabs qhs, lactulose 15cc BID. CT angio AP: Rectum distended with stool and mild rectal wall thickening with edema, possible proctitis. No acute abdominal process.  - Start home meds once NGT placed     ID  #Severe sepsis  Pt presented with severe sepsis (, RR 28, lactate 7.1). Likely multifactorial iso COVID, superimposed bacterial PNA and UTI. S/p 3.2L NS and 1L LR with hydrocortisone 100mg IVP x1.   - F/u bcx, ucx, sputum cx  - Tx infectious etiology as below     #PNA, bilateral  Pt p/w superimposed bacterial PNA. CXR: left lower-mid infiltrate. CT angio chest: no PE. Extensive bilateral lower lobe and perihilar infiltrates, bilateral hilar lymphadenopathy. Procalcitonin elevated at 1.86. Given Cefepime 2g IV x1 and azithromycin 500mg IV x1 in the ED. CURB-65 score of 5, highest risk, requiring ICU admission.   - F/u urine strep, legionella, MRSA, sputum cx  - C/w Cefepime 2g IV qd (1/23- )  - C/w Azithromycin 500mg IV qd (1/23- )    #COVID  Pt found to be (+) for COVID. S/p Decadron 6mg IV x1 in the ED.   - C/w Decadron 6mg IV qd x10 days (1/23- )  - Holding remdesivir iso LFTs    #UTI  Found to have UA (+) LE/nitrite/WBC/bacteria. Unable to assess for symptoms due to pt's mental status.  - F/u ucx   - Tx with abx as above      RENAL  #Hypernatremia  Pt p/w Na 156 in the ED. Per nursing home chart, pt was started on D5+1/2NS for 3 days. S/p 3.2L of NS bolus and 1L LR bolus in the ED.   - F/u urine studies  - Strict I&O's     #HAGMA with respiratory acidosis  Pt p/w bicarb 21, AG 17, VBG pH 7.26, pCO2 56, lactate 7.1. HAGMA likely due to lactic acidosis. Placed initially on HFNC then BiPAP. Repeat ABG with pH 7.14, pCO2 72; lactate cleared.  - BMP and ABG  - Continue tx for AHRF as above         #BPH  Pt with BPH, on home Finasteride 5mg qd.  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  - Continue with wound care      PROPHYLACTIC MEASURES:  F: S/p 3.2L NS and 1L LR  E: Replete as needed  D: NPO  DVT ppx: Lovenox 40mg SQ qd  GI ppx: Protonix 40mg IV qd  Dispo: MICU    Discussed with attending Dr. Kumar.
68 yo M with Parkinson's disease, HTN, HLD, depression, pressure ulcers (L hip, medial knees), BPH, renal calculus presented from [facility] for respiratory distress, hypoxia, and hypotension; found to be in severe sepsis acute hypoxic respiratory failure likely iso COVID and superimposed bacterial PNA. Palliative consulted for help with symptom management.

## 2024-01-23 NOTE — ED ADULT NURSE NOTE - PATIENT'S SEXUAL ORIENTATION
Patient:   SUZIE LOZANO            MRN: [id number]            FIN: [id number]               Age:   81 years     Sex:  MALE     :  36   Associated Diagnoses:   None   Author:   RASHEED KEMP      Reason for consult: New discovered Lung mas    History of present illness: Pt is 80 yo M with PMH of DM, BPH, Hypercholesterolemia, Hypertension, Hypothyroidism who was initially admitted for aphasia and was found to have intraparenchymal hemorrhage in the right cerebellar hemisphere. Pt was managed conservatively. XR on admission was signidicant for 7.5 cm rounded opacity projected over right hilum. CT chest  showed 7.2 cm pleural-based mass in the posterior right lower lobe, likely primary bronchogenic carcinoma.  No lymphadenopathy or additional pulmonary nodules. Pt had IR guided biopsy of Lung lesion. On my interview patient is lethargic, c/o pain. Per family he smoked for 50 years, stopped 10 years ago. No weight loss. Preliminary report of biopsy; small cell lung cancer    Review of Systems:  Constitutional: No fatigue  Skin: No rash  Eyes: No recent vision problems or eye pain    ENT: No congestion, ear pain, or sore throat  Endocrine: No thyroid problems    Cardiovascular: No chest pain  Respiratory: No cough, shortness of breath, congestion, or wheezing  Gastrointestinal: No nausea, no vomiting or diarrhea  Musculoskeletal: No joint swelling    Neurologic: No headache  Hematologic: No unusual bruising or bleeding  Psychiatric: No psychiatric problems, hallucinations or depression    Past medical history:   BPH (benign prostatic hyperplasia)  Diabetes  Hematuria  History of UTI  Hypercholesterolemia  Hypertension  Hypothyroid    Past surgical history:   Cataract surgery  Colonoscopy  stone in bladder  Lithotripsy using laser    Family history: No qualifying data available.  _    Social history:  _   Alcohol  Details: Use: Current.  Frequency: Daily.  Last Use: 1 shot before  dinner.  Exercise  Details: Excercise: Occasionally.  Times Per Week: 3-4 times/week.  Type: Weight lifting.  Home/Environment  Details: Patient Lives With: Alone.  Ambulation: Independent.  Bathing: Independent.  Dressing: Independent.  Driving: Independent.  Eating: Independent.  Elimination: Independent.  Grooming: Independent.  Preparing Meal: Independent.  Taking Meds: Independent.  Toileting: Independent.  Transfers: Independent.  Substance Abuse  Details: Use: None.  Tobacco  Details: Smoked/Smokeless Tobacco Last 30 Days: No.  Use: Former smoker.  Type: Cigarettes.; Comment(s): quit 2009     Home Medications (6) Active  glipiZIDE oral 5 mg tablet 10 mg = 2 tab, Oral, BID  levothyroxine 25 mcg (0.025 mg) oral tablet 25 mcg = 1 tab, Oral, Daily  lisinopril oral 10 mg tablet 10 mg = 1 tab, Oral, Daily  lovastatin oral 20 mg tablet 1 tab, Oral, Daily  metFORMIN oral 500 mg tablet 1,000 mg = 2 tab, Oral, BID  pioglitazone oral 15 mg tablet 15 mg = 1 tab, Oral, Daily  Medications (15) Active  Scheduled: (6)  Docusate sodium 100 mg cap  100 mg 1 cap, Oral, BID  Enoxaparin 40 mg/0.4 mL syringe  40 mg 0.4 mL, Subcutaneous, Daily  Insulin human lispro 10 unit/0.1 mL inj  2-12 units, Subcutaneous, QID [before meals & HS]  Levothyroxine 25 mcg tab  25 mcg 1 tab, Oral, QAM at 6  Lisinopril 10 mg tab  10 mg 1 tab, Oral, Daily  Pravastatin 20 mg tab  20 mg 1 tab, Oral, Daily  Continuous: (0)  PRN: (9)  Acetaminophen 325 mg tab  650 mg 2 tab, Oral, Q6H  Dextrose (glucose) 40% 15 gm/37.5 gm oral gel UD  15 gm, Oral, As Directed PRN  Dextrose (glucose) 50% 25 gm/50 mL syringe  12.5 gm 25 mL, IV Push, As Directed PRN  Glucagon 1 mg/1 mL emergency kit SDV  1 mg 1 mL, IM, As Directed PRN  HydrALAZINE 20 mg/1 mL inj SDV  20 mg 1 mL, Slow IV Push, Q6H  HYDROcodone-acetaminophen 5-325 mg tab  1 tab, Oral, Q6H  Milk of magnesia 8% 30 mL oral susp UD  15 mL, Oral, QID  Ondansetron 4 mg/2 mL inj SDV  4 mg 2 mL, Slow IV Push,  Q6H  Polyethylene glycol 3350 oral recon powder 17 gm packet UD  17 gm 1 packet, Oral, Daily     Allergies (1) Active Reaction  NKA None Documented      Immunizations:  Pneumovax _  Flu shot _      Vitals between:   25-MAY-2017 12:50:48   TO   26-MAY-2017 12:50:48                   LAST RESULT MINIMUM MAXIMUM  Temperature 36.4 36.1 36.6  Heart Rate 74 58 80  Respiratory Rate 16 12 16  NISBP           130 129 167  NIDBP           85 74 105  NIMBP           107 92 121  SpO2                    96 93 100    Physical exam:    General: NAD, lethargic  Neck: No JVD  CV: RRR, S1/S2, no murmurs, no gallops, no rubs  Chest: decreased BS BL, no wheezing, no crackles  Abd: Soft, nontender, nondistended, +BS  Neuro: A and O x4      Labs:      Labs between:  25-MAY-2017 12:50 to 26-MAY-2017 12:50    POC GLU:                 Latest Result  Latest Date  Minimum  Min Date  Maximum  Max Date                             (H) 175  26-MAY-2017 (H) 175  26-MAY-2017 (H) 158  25-MAY-2017                 Assessment/Plan:     1. Small Cell Lung Cancer  - Immunohistochemistry pending  - Pt need CT abd and pelvis with contrast, MRI brain and Bone scan for staging.  - Pt has poor performance status (ECOG 4). Will discuss treatment options if any after imaging results    Seen and d/w Dr Orlando Page MD  IM PGY-2  Contact through PS               Electronically Signed On 05/26/2017 15:26  __________________________________________________   RASHEED KEMP     Unknown

## 2024-01-23 NOTE — ED ADULT NURSE NOTE - NSFALLHARMRISKINTERV_ED_ALL_ED

## 2024-01-23 NOTE — CONSULT NOTE ADULT - CONVERSATION DETAILS
Home
Confirmed that per previous conversation, family wishes to focus on patient's comfort. They endorse that he has had cognitive decline over the past couple of years but really declined over the past 6 months after a hospitalization that left him bedbound. They feel that his quality of life is poor and want to focus on his comfort moving forward. This includes making him DNR and DNI, no blood draws or antibiotics and removing his BIPAP once he is breathing more comfortably with symptom-medications.

## 2024-01-24 PROBLEM — N40.0 BENIGN PROSTATIC HYPERPLASIA WITHOUT LOWER URINARY TRACT SYMPTOMS: Chronic | Status: ACTIVE | Noted: 2024-01-01

## 2024-01-24 PROBLEM — Z86.69 PERSONAL HISTORY OF OTHER DISEASES OF THE NERVOUS SYSTEM AND SENSE ORGANS: Chronic | Status: ACTIVE | Noted: 2024-01-01

## 2024-01-24 PROBLEM — E78.5 HYPERLIPIDEMIA, UNSPECIFIED: Chronic | Status: ACTIVE | Noted: 2024-01-01

## 2024-01-24 PROBLEM — I10 ESSENTIAL (PRIMARY) HYPERTENSION: Chronic | Status: ACTIVE | Noted: 2024-01-01

## 2024-01-24 PROBLEM — Z86.59 PERSONAL HISTORY OF OTHER MENTAL AND BEHAVIORAL DISORDERS: Chronic | Status: ACTIVE | Noted: 2024-01-01

## 2024-01-24 NOTE — PROGRESS NOTE ADULT - PROBLEM SELECTOR PLAN 4
F: none  E: no more labs  D: NPO  DVT ppx: Lovenox 40mg SQ qd  GI ppx: Protonix 40mg IV qd  Code: DNR/DNI/Mews exempt, CMO  Dispo: 7WO
F: none  E: no more labs  D: NPO  DVT ppx: Lovenox 40mg SQ qd  GI ppx: Protonix 40mg IV qd  Code: DNR/DNI/Mews exempt, CMO  Dispo: 7WO

## 2024-01-24 NOTE — PROGRESS NOTE ADULT - SUBJECTIVE AND OBJECTIVE BOX
Internal Medicine Progress Note  Trudy Smith, PGY-1  Pager: [phone]    ******INCOMPLETE******    Patient is a 69y old  Male who presents with a chief complaint of sepsis, AHRF (23 Jan 2024 06:35)    OVERNIGHT EVENTS/INTERVAL HPI:    REVIEW OF SYSTEMS:  All other review of systems is negative unless indicated above.    OBJECTIVE:  T(C): 36.7 (01-24-24 @ 05:34), Max: 38.4 (01-23-24 @ 13:50)  HR: 90 (01-24-24 @ 05:34) (84 - 115)  BP: 98/67 (01-24-24 @ 05:34) (80/51 - 98/67)  RR: 18 (01-24-24 @ 05:34) (15 - 35)  SpO2: 81% (01-24-24 @ 05:34) (73% - 97%)  Daily     Daily     Physical Exam:  General: in no acute distress  Eyes: EOMI intact bilaterally. Anicteric sclerae, moist conjunctivae  HENT: Moist mucous membranes  Neck: Trachea midline, supple  Lungs: CTA B/L. No wheezes, rales, or rhonchi  Cardiovascular: RRR. No murmurs, rubs, or gallops  Abdomen: Soft, non-tender non-distended; No rebound or guarding  Extremities: WWP, No clubbing, cyanosis or edema  MSK: No midline bony tenderness. No CVA tenderness bilaterally  Neurological: Alert and oriented x3  Skin: Warm and dry. No obvious rash     Medications:  MEDICATIONS  (STANDING):  chlorhexidine 2% Cloths 1 Application(s) Topical <User Schedule>    MEDICATIONS  (PRN):  glycopyrrolate Injectable 0.4 milliGRAM(s) IV Push four times a day PRN Secretions  LORazepam   Injectable 0.5 milliGRAM(s) IV Push every 2 hours PRN Agitation  morphine  - Injectable 4 milliGRAM(s) IV Push every 1 hour PRN RR >22 or WOB      Labs:                        10.5   18.32 )-----------( 476      ( 23 Jan 2024 06:15 )             35.9     01-23    151<H>  |  119<H>  |  29<H>  ----------------------------<  162<H>  3.9   |  25  |  0.65    Ca    8.2<L>      23 Jan 2024 06:15  Phos  3.9     01-23  Mg     2.0     01-23    TPro  6.4  /  Alb  2.2<L>  /  TBili  1.3<H>  /  DBili  x   /  AST  53<H>  /  ALT  52<H>  /  AlkPhos  176<H>  01-23    PT/INR - ( 23 Jan 2024 01:20 )   PT: 15.6 sec;   INR: 1.38          PTT - ( 23 Jan 2024 01:20 )  PTT:29.1 sec  Urinalysis Basic - ( 23 Jan 2024 06:15 )    Color: x / Appearance: x / SG: x / pH: x  Gluc: 162 mg/dL / Ketone: x  / Bili: x / Urobili: x   Blood: x / Protein: x / Nitrite: x   Leuk Esterase: x / RBC: x / WBC x   Sq Epi: x / Non Sq Epi: x / Bacteria: x      SARS-CoV-2: Detected (23 Jan 2024 01:13)      Radiology: Reviewed Patient is a 69y old  Male who presents with a chief complaint of sepsis, AHRF (23 Jan 2024 06:35)    OVERNIGHT EVENTS/INTERVAL HPI: No acute overnight events. Patient unable to participate in exam d/t non-verbal status.     REVIEW OF SYSTEMS:  All other review of systems is negative unless indicated above.    OBJECTIVE:  T(C): 36.7 (01-24-24 @ 05:34), Max: 38.4 (01-23-24 @ 13:50)  HR: 90 (01-24-24 @ 05:34) (84 - 115)  BP: 98/67 (01-24-24 @ 05:34) (80/51 - 98/67)  RR: 18 (01-24-24 @ 05:34) (15 - 35)  SpO2: 81% (01-24-24 @ 05:34) (73% - 97%)  Daily     Daily     Physical Exam:  General: laying in bed, appears to be in moderate distress  Lungs: Labored breathing, decreased breath sounds   Cardiovascular: RRR. No murmurs, rubs, or gallops  Abdomen: Soft, non-tender non-distended; No rebound or guarding  Extremities: WWP, No clubbing, cyanosis or edema  MSK: pressure wound on left (stage 1) and right (unstageable)  Neurological: withdraws to pain    Medications:  MEDICATIONS  (STANDING):  chlorhexidine 2% Cloths 1 Application(s) Topical <User Schedule>    MEDICATIONS  (PRN):  glycopyrrolate Injectable 0.4 milliGRAM(s) IV Push four times a day PRN Secretions  LORazepam   Injectable 0.5 milliGRAM(s) IV Push every 2 hours PRN Agitation  morphine  - Injectable 4 milliGRAM(s) IV Push every 1 hour PRN RR >22 or WOB      Labs:                        10.5   18.32 )-----------( 476      ( 23 Jan 2024 06:15 )             35.9     01-23    151<H>  |  119<H>  |  29<H>  ----------------------------<  162<H>  3.9   |  25  |  0.65    Ca    8.2<L>      23 Jan 2024 06:15  Phos  3.9     01-23  Mg     2.0     01-23    TPro  6.4  /  Alb  2.2<L>  /  TBili  1.3<H>  /  DBili  x   /  AST  53<H>  /  ALT  52<H>  /  AlkPhos  176<H>  01-23    PT/INR - ( 23 Jan 2024 01:20 )   PT: 15.6 sec;   INR: 1.38          PTT - ( 23 Jan 2024 01:20 )  PTT:29.1 sec  Urinalysis Basic - ( 23 Jan 2024 06:15 )    Color: x / Appearance: x / SG: x / pH: x  Gluc: 162 mg/dL / Ketone: x  / Bili: x / Urobili: x   Blood: x / Protein: x / Nitrite: x   Leuk Esterase: x / RBC: x / WBC x   Sq Epi: x / Non Sq Epi: x / Bacteria: x      SARS-CoV-2: Detected (23 Jan 2024 01:13)      Radiology: Reviewed

## 2024-01-24 NOTE — DISCHARGE NOTE PROVIDER - NSDCMRMEDTOKEN_GEN_ALL_CORE_FT
ascorbic acid 500 mg oral tablet: 1 tab(s) orally once a day for wound care supplement  carbidopa-levodopa 25 mg-100 mg oral tablet: 1 tab(s) orally every 8 hours for parkinsons  cyanocobalamin 1000 mcg oral tablet: 1 tab(s) orally once a day  finasteride 5 mg oral tablet: 1 tab(s) orally once a day for BPH  GlycoLax oral powder for reconstitution: 17 gram(s) orally once a day for constipation  lactulose 10 g/15 mL oral syrup: 15 milliliter(s) orally every 12 hours for constipation  memantine 7 mg oral capsule, extended release: 1 cap(s) orally once a day for dementia  Multiple Vitamins oral tablet: 1 tab(s) orally once a day for wound care supplement  rivastigmine 9.5 mg/24 hr transdermal film, extended release: 1 patch transdermally every 24 hours for dementia; remove per schedule  Senna 8.6 mg oral tablet: 2 tab(s) orally once a day (at bedtime) for constipation  Tylenol 325 mg oral tablet: 2 tab(s) orally every 6 hours as needed for  pain

## 2024-01-24 NOTE — H&P ADULT - NSICDXPASTMEDICALHX_GEN_ALL_CORE_FT
PAST MEDICAL HISTORY:  BPH (benign prostatic hyperplasia)     History of depression     History of Parkinson's disease     HTN (hypertension)     Hyperlipidemia

## 2024-01-24 NOTE — DISCHARGE NOTE NURSING/CASE MANAGEMENT/SOCIAL WORK - NSDCPEFALRISK_GEN_ALL_CORE
For information on Fall & Injury Prevention, visit: [email or web address] OR  [email or web address] OR  https://www.cdc.gov/steadi/patient.html

## 2024-01-24 NOTE — DISCHARGE NOTE PROVIDER - ATTENDING DISCHARGE PHYSICAL EXAMINATION:
General: laying in bed, appears to be in moderate distress  Lungs: Labored breathing, decreased breath sounds   Cardiovascular: RRR. No murmurs, rubs, or gallops  Abdomen: Soft, non-tender non-distended; No rebound or guarding  Extremities: WWP, No clubbing, cyanosis or edema  MSK: pressure wound on left (stage 1) and right (unstageable)  Neurological: withdraws to pain   General: laying in bed, appears to be in moderate distress  Lungs: Labored breathing, decreased breath sounds   Cardiovascular: RRR. No murmurs, rubs, or gallops  Abdomen: Soft, non-tender non-distended; No rebound or guarding  Extremities: WWP, No clubbing, cyanosis or edema  MSK: pressure wound on left (stage 1) and right (unstageable)  Neurological: withdraws to pain

## 2024-01-24 NOTE — PROGRESS NOTE ADULT - ASSESSMENT
69-year-old male with PMHx Parkinson's disease, HTN, HLD, depression, pressure ulcers (L hip, medial knees), BPH, renal calculus presented from [facility] for respiratory distress, hypoxia, and hypotension; found to be in severe sepsis acute hypoxic respiratory failure likely iso COVID and superimposed bacterial PNA, decision made with family to focus on comfort measure.

## 2024-01-24 NOTE — H&P ADULT - PROBLEM SELECTOR PLAN 5
.  Complex symptom management in the setting of end of life.    Requires GIP for titration of palliative regimen for adequate symptom relief at end of life. Emotional support provided, questions answered.  Active Psychosocial Referrals:  [x]Social Work/Case management []PT/OT []Chaplaincy [x]Hospice  []Patient/Family Support []Holistic RN []Massage Therapy []Music Therapy []Ethics  Coping: [] well [] with difficulty [] poor coping [x] unable to assess  Support system: [] strong [x] adequate [] inadequate    For new or uncontrolled symptoms, please call Palliative Care at [phone] [phone]). The service is available 24/7 (including nights & weekends) to provide symptom management recommendations over the phone as appropriate

## 2024-01-24 NOTE — H&P ADULT - PROBLEM SELECTOR PLAN 3
.  In the setting of COVID and PNA  -wean supplemental O2, prioritize medications over uptitration of O2

## 2024-01-24 NOTE — H&P ADULT - NSHPSOCIALHISTORY_GEN_ALL_CORE
SOCIAL HISTORY:   Significant other/partner:  []  Children:  []   Substance hx:  []   Tobacco hx:  []   Alcohol hx: []  Living Situation: []Home  []Long term care  [x]Rehab []Other  Baptist/Spiritual practice: ; Role of organized Tenriism [] important [] some [x] unable to assess  Coping: [] well [] with difficulty [] poor coping [x] unable to assess  Support system: [] strong [x] adequate [] inadequate    ADVANCE DIRECTIVES:    [x]MOLST: DNR/DNI  DECISION MAKER(s):  [] Health Care Proxy(s)  [x] Surrogate(s)  [] Guardian           Name(s)/Phone Number(s): Elba Mcguire ([phone]), Ade Garcia ([phone])

## 2024-01-24 NOTE — H&P ADULT - NSHPSOURCEINFOTX_GEN_ALL_CORE
[facility] / [facility]: Call [facility] at [phone] or [facility] at [phone] for symptom management or to request interdisciplinary team intervention (RN/CM, SW, ) for patient/family

## 2024-01-24 NOTE — DISCHARGE NOTE PROVIDER - HOSPITAL COURSE
Hospital course by problem  CLIF PICKARD is a 69y Male with a past medical history of _____    Presented with acute hypoxic respiratory failure and found to have b/l pneumonia and severe sepsis     Problem List/Main Diagnoses (system-based):   #     #     #    Patient was discharged to: (home/[facility]/acute rehab/hospice, etc. and w/ home health/home PT/RN/home O2)    New medications:   Changes to old medications:  Medications that were stopped:  New Hardware:  New DME:    Items to follow up as outpatient:    Discharge physical exam:     Hospital course by problem  CLIF PICKARD is a 69y Male with a past medical history of advanced parkinson's disease, HTN, HLD, depression, pressure ulcers (L hip, medial knees), BPH and renal calculus.    Presented with acute hypoxic respiratory failure  hypoxia, and hypotension and was found to have b/l pneumonia and severe sepsis likely due to COVID and superimposed bacterial PNA, decision made with family to focus on comfort measure.      Problem List/Main Diagnoses (system-based):     #  Acute respiratory failure with hypoxia in the setting of COVID and superimposed bacterial PNA  On arrival, O2 saturation of 86% at 15L nonrebreather mask. CXR: left lower-mid infiltrate. CT angio chest: no PE. Extensive bilateral lower lobe and perihilar infiltrates, bilateral hilar lymphadenopathy. Procalcitonin elevated at 1.86. Given Cefepime 2g IV x1 and azithromycin 500mg IV x1 in the ED. CURB-65 score of required ICU admission.   Pt switched to to HFNC 40/70 with an O2 saturation of 91%, however persisted to be tachypneic -> placed on BiPAP with a P/F ratio 99mmHg and taken off BIPAP, then transferred to [facility] for comfort care after goals of care discussion with family.   Currently on no bipap or noninvasive/invasive ventilation per comfort care measures.   - s/p Decadron 6mg IV qd for 1 day for COVID-19  - c/w morphine, lorazepam, glycopyrrolate for comfort    # Sepsis 2/2 bacteremia   On arrival, WBC 13K --> 18K on arrival, Lactate 7 --> 4.1 --> 1.2 (01/24)   S/p 3.2L NS and 1L LR with hydrocortisone 100mg IVP x1.   Blood cx positive for gram positive cocci and clusters  No antibiotics at this time.       #Parkinson disease   Plan: Pt with Parkinson's disease presenting with lethargy. Likely iso sepsis.   On home Carbidopa-Levodopa 25-100mg TID, Memantine 7mg qd and Rivastigmine transdermal patch qd.  likely chronic progression of dx.    Patient was discharged to: inpatient hospice.     New medications:  morphine, lorazepam, glycopyrrolate for comfort, Lovenox 40mg SQ qd, Protonix 40mg IV qd    Changes to old medications:  Medications that were stopped:   New Hardware:  New DME:    Items to follow up as outpatient:    Discharge physical exam:

## 2024-01-24 NOTE — PROGRESS NOTE ADULT - PROBLEM SELECTOR PLAN 2
likely contributing factor to above AHRF   s/p Decadron 6mg IV qd for 1 day  no further intervention - focusing on patients sx and comfort at this time
likely contributing factor to above AHRF   s/p Decadron 6mg IV qd for 1 day  no further intervention - focusing on patients sx and comfort at this time

## 2024-01-24 NOTE — PROCEDURE NOTE - NSSITEPREP_SKIN_A_CORE
Adherence to aseptic technique: hand hygiene prior to donning barriers (gown, gloves), don cap and mask, sterile drape over patient
chlorhexidine
chlorhexidine

## 2024-01-24 NOTE — H&P ADULT - NSHPLABSRESULTS_GEN_ALL_CORE
LABS:                        10.5   18.32 )-----------( 476      ( 23 Jan 2024 06:15 )             35.9   01-23    151<H>  |  119<H>  |  29<H>  ----------------------------<  162<H>  3.9   |  25  |  0.65    Ca    8.2<L>      23 Jan 2024 06:15  Phos  3.9     01-23  Mg     2.0     01-23  TPro  6.4  /  Alb  2.2<L>  /  TBili  1.3<H>  /  DBili  x   /  AST  53<H>  /  ALT  52<H>  /  AlkPhos  176<H>  01-23    RADIOLOGY & ADDITIONAL STUDIES:  < from: CT Angio Chest PE Protocol w/ IV Cont (01.23.24 @ 03:43) >  1.   No evidence of pulmonary embolus.  No evidence of aortic dissection or acute aortic syndrome.  2.   Extensive bilateral lower lobe and perihilar infiltrates.  3.   Bilateral hilar lymphadenopathy.  4.   Coronary atherosclerosis.  5.   The rectum is distended with stool and there is mild rectal wall thickening with edema, possible proctitis correlate with clinical signs and symptoms.  6.   No acute abdominal process otherwise demonstrated.

## 2024-01-24 NOTE — PROGRESS NOTE ADULT - PROBLEM SELECTOR PLAN 1
Pt presented from [facility] with hypoxia, likely iso of COVID and superimposed bacterial PNA.   Initially in the ED, satting 86% at 15L nonrebreather mask then switched to HFNC 40/70, satting 91%, however persisted to be tachypneic -> placed on BiPAP. P/F ratio 99mmHg. Now off of bipap.  -no bipap or noninvasive/invasive ventialtion  -c/w morphine, lorazepam, glycopyrrolate for comfort  -f/u palliative recs
Pt presented from [facility] with hypoxia, likely iso of COVID and superimposed bacterial PNA.   Initially in the ED, satting 86% at 15L nonrebreather mask then switched to HFNC 40/70, satting 91%, however persisted to be tachypneic -> placed on BiPAP. P/F ratio 99mmHg. Now off of bipap.  - no bipap or noninvasive/invasive ventilation  - c/w morphine, lorazepam, glycopyrrolate for comfort  - f/u palliative recs

## 2024-01-24 NOTE — H&P ADULT - PROBLEM SELECTOR PLAN 1
.  -Morphine 4mg IV q8h ATC  -Morphine 4mg IV q2h PRN for Moderate/Severe Pain or RR>22  -Haldol 1mg IV q4h PRN for Anxiety/Agitation  -Robinul 0.4mg IV q6h ATC  -Scopolamine Patch q72hr

## 2024-01-24 NOTE — H&P ADULT - NS ATTEST RISK PROBLEM GEN_ALL_CORE FT
Actively Dying  Chronic Illness With Severe Exacerbation/Progression  Decision Made To Not Resuscitate (DNR)  Decision Made To De-escalate Care Due To Poor Prognosis  Prescription Of Parenteral Controlled Substances

## 2024-01-24 NOTE — PROGRESS NOTE ADULT - PROBLEM SELECTOR PLAN 3
Pt with Parkinson's disease presenting with lethargy. Likely iso sepsis.   On home Carbidopa-Levodopa 25-100mg TID, Memantine 7mg qd and Rivastigmine transdermal patch qd.  likely chronic progression of dx
Pt with Parkinson's disease presenting with lethargy. Likely iso sepsis.   On home Carbidopa-Levodopa 25-100mg TID, Memantine 7mg qd and Rivastigmine transdermal patch qd.  likely chronic progression of dx

## 2024-01-24 NOTE — DISCHARGE NOTE PROVIDER - CARE PROVIDER_API CALL
Autumn Bean  Hospice/Palliative Medicine  [address] [address]  Phone: [phone]  Fax: [phone]  Follow Up Time:

## 2024-01-24 NOTE — H&P ADULT - NSHPPHYSICALEXAM_GEN_ALL_CORE
PHYSICAL EXAM:  GENERAL:  [] NAD []Alert [x]Lethargic  []Cachexia  [x]Unarousable  []Verbal  [x]Non-Verbal  Behavioral:   []Anxiety  [x]Delirium []Agitation []Cooperative [x]Oriented x0  HEENT:  [x]Normal  [] Moist Mucous Membranes [x]Dry mouth   []ET Tube/Trach  []Oral lesions  PULMONARY:   []Clear []Tachypnea  []Audible excessive secretions  []Normal Work of Breathing [x]Labored Breathing  [x]Rhonchi []Crackles []Wheezing  CARDIOVASCULAR:    [x]Regular Rate [x]Regular Rhythm []Irregular []Tachy  []Lonny  GASTROINTESTINAL:  [x]Soft  [x]Distended   []+BS  []Non tender []Tender  []PEG []OGT/ NGT  Last BM:  GENITOURINARY:  []Normal [x] Incontinent   []Oliguria/Anuria   []Gandhi  MUSCULOSKELETAL:   []Normal Extremities  [x]Weakness  [x]Bed/Wheelchair bound []Edema  NEUROLOGIC:   []No focal deficits  []Cognitive impairment  [x]Dysphagia []Dysarthria []Paresis [x]Encephalopathic  SKIN:   []Normal   [x]Pressure ulcer(s)  []Rash    Vital Signs Last 24 Hrs  T(C): 37 (24 Jan 2024 12:47), Max: 37 (24 Jan 2024 12:47)  T(F): 98.6 (24 Jan 2024 12:47), Max: 98.6 (24 Jan 2024 12:47)  HR: 100 (24 Jan 2024 12:47) (84 - 100)  BP: 90/54 (24 Jan 2024 12:47) (90/54 - 98/67)  BP(mean): 71 (23 Jan 2024 18:14) (71 - 71)  RR: 18 (24 Jan 2024 05:34) (18 - 18)  SpO2: 86% (24 Jan 2024 12:47) (80% - 86%)

## 2024-01-24 NOTE — PROCEDURE NOTE - NSPROCDETAILS_GEN_ALL_CORE
location identified, draped/prepped, sterile technique used/blood seen on insertion/dressing applied/flushes easily/secured in place/sterile technique, catheter placed
a urinary catheter insertion kit was used for all insertion materials

## 2024-01-24 NOTE — H&P ADULT - NSHPREVIEWOFSYSTEMS_GEN_ALL_CORE
PRESENT SYMPTOMS/REVIEW OF SYSTEMS: [x]Unable to obtain due to poor mentation/encephalopathy  Source if other than patient:  [x]Family   [x]Team     Pain: [x] yes [] no - see PAINAD  QOL Impact -   Location -                    Aggravating Factors -  Quality -  Radiation -  Timing -  Severity (0-10 scale) -   Minimal Acceptable Level (0-10 scale) -    PAIN AD Score: 4  (Nonverbal Pain Assessment Scale)    Dyspnea:                           []Mild  [x]Moderate []Severe  Anxiety:                             []Mild []Moderate []Severe  Fatigue:                             []Mild []Moderate []Severe  Nausea:                             []Mild []Moderate []Severe  Loss of Appetite:              []Mild []Moderate []Severe  Constipation:                    []Mild []Moderate []Severe    Other Symptoms:  [x]All Other Review Of Systems Negative     Palliative Performance Status Version 2:  10%

## 2024-01-24 NOTE — H&P ADULT - HISTORY OF PRESENT ILLNESS
Hospice GIP Admission    SYMPTOMS REQUIRING GIP LEVEL OF CARE:  []Pain  [x]Dyspnea  []Anxiety/Agitation  []Nausea  []Active Seizure    HPI: 68yo M with PMH of Parkinson's, Dementia, HTN, HLD, and Chronic Pressure Ulcers p/w respiratory failure and found to have COVID/PNA. Patient unable to participate in interview. Decision made to prioritize comfort and allow a natural disease process to unfold. Appears to be imminently dying, symptomatic, and requiring IV medications because there is no reliable oral route. Transitioned to inpatient hospice for management of dyspnea at end of life due to respiratory failure from COVID and dementia.    [x]Pain/Dyspnea managed by hourly monitoring and titration of IV Morphine  [x]Pain/Dyspnea improved as a result of aggressive titration of IV Morphine  [x]GIP to manage uncontrolled pain/dyspnea and continuous titrations of IV Morphine  [x]Requires frequent skilled nursing assessment for non-verbal signs of pain/dyspnea/agitation through grimacing, groaning, tachypnea, writhing, rigidity  [x]Effectiveness of pain/tachypnea management is continuously reevaluated hourly to achieve maximal comfort    Comprehensive symptom assessment and justification of GIP level of care as noted. Patient continues to require symptom monitoring through multiple daily bedside assessments and daily intervention through the administration of PRN medications and adjustment of scheduled opiates. See patient's PRN use for the past 24hrs noted below. Extensive time spent discussing care plan with family. No unexpected adverse effects of opiates noted. Plan of care discussed collaboratively with Hospice and Facility staff.    REFERRALS: [x]Hospice [x]Social Work  []Chaplaincy  [x]Patient/Family Support []Massage Therapy []Music Therapy    DISCUSSION OF CASE: Family - to obtain additional history and to provide emotional support; Hospice Liaison - to discuss plan of care; RN - to discuss symptom burden and regimen adjustment

## 2024-01-24 NOTE — PROCEDURE NOTE - NSICDXPROCEDURE_GEN_ALL_CORE_FT
PROCEDURES:  US guided vascular access 23-Jan-2024 07:08:52  Jose Tran  
PROCEDURES:  Insertion, Gandhi catheter, complex 23-Jan-2024 03:51:41  Lazara Del Rio  
PROCEDURES:  US guided vascular access 23-Jan-2024 07:08:52  Jose Tran

## 2024-01-24 NOTE — DISCHARGE NOTE NURSING/CASE MANAGEMENT/SOCIAL WORK - PATIENT PORTAL LINK FT
You can access the FollowMyHealth Patient Portal offered by [facility] by registering at the following website: [email or web address]. By joining [facility]’s FollowMyHealth portal, you will also be able to view your health information using other applications (apps) compatible with our system.

## 2024-01-25 NOTE — DIETITIAN INITIAL EVALUATION ADULT - PROBLEM SELECTOR PLAN 5
.  Complex symptom management in the setting of end of life.    Requires GIP for titration of palliative regimen for adequate symptom relief at end of life. Emotional support provided, questions answered.  Active Psychosocial Referrals:  [x]Social Work/Case management []PT/OT []Chaplaincy [x]Hospice  []Patient/Family Support []Holistic RN []Massage Therapy []Music Therapy []Ethics  Coping: [] well [] with difficulty [] poor coping [x] unable to assess  Support system: [] strong [x] adequate [] inadequate    For new or uncontrolled symptoms, please call Palliative Care at [phone] [phone]). The service is available 24/7 (including nights & weekends) to provide symptom management recommendations over the phone as appropriate

## 2024-01-25 NOTE — DIETITIAN INITIAL EVALUATION ADULT - OTHER INFO
69M with PMH of Parkinson's, Dementia, HTN, HLD, and Chronic Pressure Ulcers presents with respiratory failure and found to have COVID/PNA. Transitioned to inpatient hospice for management of dyspnea at end of life due to respiratory failure from COVID and dementia.    Pt seen for nutrition assessment, now transferred to inpatient hospice. No ht/wt in chart, per prior admission data (1/23), pt 5'9". NPO. No nausea/vomiting/diarrhea/constipation noted, abdominal distension documented, last recorded BM 1/23. Nonverbal indicators of pain absent - pain regimen ordered. 1+ bilateral arm edema noted, Jorje score 6, pressure injuries documented: right upper back unstageable, mid back unstageable, sacrum unstageable, right thigh stage 2, left hip stage 4, left knee stage 3, left ankle/heel unstageable, right ankle unstageable, left shin unstageable. NKFA. Pt noted with severe global wasting. Pt meets criteria for severe protein-calorie malnutrition due to prolonged inadequate energy intake related to clinical status. Pt with goals of care aligned with comfort measures - order in chart. With consideration for pressure injuries, pt determined at moderate complexity. See nutrition recommendations.

## 2024-01-25 NOTE — DIETITIAN INITIAL EVALUATION ADULT - ADD RECOMMEND
1. Continue diet as ordered.   >>Maintain nutrition within goals of care at all times.   >>Consider comfort feeds if pt alert and expressing desire to eat, defer consistency to team/SLP. Do not force feed. Honor all food preferences as able.  2. Monitor GI tolerance, weight trends, labs, & skin integrity as appropriate.  3. Defer bowel and pain regimens to team.   4. RD to remain available for dietary intervention prn.  1. Continue current nutrition care plan.   >>Maintain nutrition within goals of care at all times.   >>Consider comfort feeds if pt alert and expressing desire to eat, defer consistency to team/SLP. Do not force feed. Honor all food preferences as able.  2. Monitor GI tolerance, weight trends, labs, & skin integrity as appropriate.  3. Defer bowel and pain regimens to team.   4. RD to remain available for dietary intervention prn.

## 2024-01-25 NOTE — DIETITIAN NUTRITION RISK NOTIFICATION - ADDITIONAL COMMENTS/DIETITIAN RECOMMENDATIONS
Continue current nutrition care plan.   >>Maintain nutrition within goals of care at all times.   >>Consider comfort feeds if pt alert and expressing desire to eat, defer consistency to team/SLP. Do not force feed. Honor all food preferences as able.

## 2024-01-25 NOTE — DIETITIAN INITIAL EVALUATION ADULT - NSFNSPHYEXAMSKINFT_GEN_A_CORE
Pressure Injury 1: Right:, upper, back, Unstageable  Pressure Injury 2: midline, back, Stage II  Pressure Injury 3: sacrum, Unstageable  Pressure Injury 4: Right:, thigh, Stage II  Pressure Injury 5: Left:, hip, Stage IV  Pressure Injury 6: Left:, knee, Stage III  Pressure Injury 7: Left:, heel, ankle, Unstageable  Pressure Injury 8: Right:, heel, Unstageable  Pressure Injury 9: Left:, shin, Unstageable

## 2024-01-25 NOTE — DIETITIAN INITIAL EVALUATION ADULT - PERTINENT MEDS FT
MEDICATIONS  (STANDING):  glycopyrrolate Injectable 0.4 milliGRAM(s) IV Push four times a day  lactated ringers. 1000 milliLiter(s) (10 mL/Hr) IV Continuous <Continuous>  morphine  - Injectable 4 milliGRAM(s) IV Push every 6 hours  scopolamine 1 mG/72 Hr(s) Patch 1 Patch Transdermal every 72 hours    MEDICATIONS  (PRN):  acetaminophen  Suppository .. 650 milliGRAM(s) Rectal every 6 hours PRN Temp greater or equal to 38C (100.4F), Mild Pain (1 - 3)  bisacodyl Suppository 10 milliGRAM(s) Rectal daily PRN Constipation  haloperidol    Injectable 1 milliGRAM(s) IV Push every 2 hours PRN Agitation  morphine  - Injectable 4 milliGRAM(s) IV Push every 2 hours PRN Moderate pain (4-6), Severe pain (7-10), Respiratory rate greater than 22

## 2024-01-25 NOTE — DIETITIAN INITIAL EVALUATION ADULT - OTHER CALCULATIONS
Estimated needs based on IBW as no dosing wt available at this time. Needs adjusted for age, malnutrition, and wound healing.

## 2024-01-26 NOTE — PROGRESS NOTE ADULT - ASSESSMENT
68yo M with PMH of Parkinson's, Dementia, HTN, HLD, and Chronic Pressure Ulcers p/w respiratory failure and found to have COVID/PNA. Transitioned to inpatient hospice for management of dyspnea at end of life due to respiratory failure from COVID and dementia.    ·	Call [facility] ([phone]) for any issues 24/7 regarding symptom management.  ·	Call [facility] ([phone]) for symptom management or to request interdisciplinary team intervention (RN/CM, SW, ) for patient/family.  
68yo M with PMH of Parkinson's, Dementia, HTN, HLD, and Chronic Pressure Ulcers p/w respiratory failure and found to have COVID/PNA. Transitioned to inpatient hospice for management of dyspnea at end of life due to respiratory failure from COVID and dementia.    ·	Call [facility] ([phone]) for any issues 24/7 regarding symptom management.  ·	Call [facility] ([phone]) for symptom management or to request interdisciplinary team intervention (RN/CM, SW, ) for patient/family.

## 2024-01-26 NOTE — PROGRESS NOTE ADULT - NS ATTEST RISK PROBLEM GEN_ALL_CORE FT
Actively Dying  Chronic Illness With Severe Exacerbation/Progression  Decision Made To Not Resuscitate (DNR)  Decision Made To De-escalate Care Due To Poor Prognosis  Prescription Of Parenteral Controlled Substances
Actively Dying  Chronic Illness With Severe Exacerbation/Progression  Decision Made To Not Resuscitate (DNR)  Decision Made To De-escalate Care Due To Poor Prognosis  Prescription Of Parenteral Controlled Substances

## 2024-01-26 NOTE — PROGRESS NOTE ADULT - PROBLEM SELECTOR PLAN 2
.  PPSV = 10%, requires total assistance for all ADLs  -c/w supportive care, turning and positioning
.  PPSV = 10%, requires total assistance for all ADLs  -c/w supportive care, turning and positioning

## 2024-01-26 NOTE — PROGRESS NOTE ADULT - NUTRITIONAL ASSESSMENT
This patient has been assessed with a concern for Malnutrition and has been determined to have a diagnosis/diagnoses of Severe protein-calorie malnutrition.    This patient has been assessed with a concern for Malnutrition and has been determined to have a diagnosis/diagnoses of Severe protein-calorie malnutrition.

## 2024-01-26 NOTE — PROGRESS NOTE ADULT - PROBLEM SELECTOR PLAN 5
.  Complex symptom management in the setting of end of life.    Requires GIP for titration of palliative regimen for adequate symptom relief at end of life. Emotional support provided, questions answered.  Active Psychosocial Referrals:  [x]Social Work/Case management []PT/OT []Chaplaincy [x]Hospice  []Patient/Family Support []Holistic RN []Massage Therapy []Music Therapy []Ethics  Coping: [] well [] with difficulty [] poor coping [x] unable to assess  Support system: [] strong [x] adequate [] inadequate    For new or uncontrolled symptoms, please call Palliative Care at [phone] ([phone]). The service is available 24/7 (including nights & weekends) to provide symptom management recommendations over the phone as appropriate
.  Complex symptom management in the setting of end of life.    Requires GIP for titration of palliative regimen for adequate symptom relief at end of life. Emotional support provided, questions answered.  Active Psychosocial Referrals:  [x]Social Work/Case management []PT/OT []Chaplaincy [x]Hospice  []Patient/Family Support []Holistic RN []Massage Therapy []Music Therapy []Ethics  Coping: [] well [] with difficulty [] poor coping [x] unable to assess  Support system: [] strong [x] adequate [] inadequate    For new or uncontrolled symptoms, please call Palliative Care at [phone]). The service is available 24/7 (including nights & weekends) to provide symptom management recommendations over the phone as appropriate

## 2024-01-26 NOTE — PROGRESS NOTE ADULT - PROBLEM SELECTOR PLAN 1
.  -Morphine 4mg IV q6h ATC  -Morphine 4mg IV q2h PRN for Moderate/Severe Pain or RR>22  -Haldol 1mg IV q4h PRN for Anxiety/Agitation  -Robinul 0.4mg IV q6h ATC  -Scopolamine Patch q72hr
.  -Dilaudid 1mg IV q4h ATC  -Dilaudid 1mg IV q2h PRN for Moderate/Severe Pain or RR>22  -Haldol 1mg IV q4h PRN for Anxiety/Agitation  -Robinul 0.4mg IV q6h ATC  -Scopolamine Patch q72hr

## 2024-01-26 NOTE — PROGRESS NOTE ADULT - SUBJECTIVE AND OBJECTIVE BOX
Hospice GIP Daily Documentation  [facility] / [facility]  Contact Info: Call [facility] at [phone] or [facility] at [phone] for symptom management or to request interdisciplinary team intervention (RN/OLGA, MAE, ) for patient/family    SYMPTOMS REQUIRING GIP LEVEL OF CARE:  []Pain  [x]Dyspnea  []Anxiety/Agitation  []Nausea  []Seizure    HPI/INTERVAL EVENTS: Unable to participate in interview. Intermittently tachypneic. Required 2 extra doses of Morphine to achieve optimal comfort. Dyspnea managed by hourly monitoring and titration of IV Morphine. Dyspnea improved as a result of aggressive titration of IV Morphine. GIP to manage uncontrolled pain/dyspnea and continuous titrations of scheduled IV Morphine. Requires frequent skilled nursing assessment for non-verbal signs of pain/dyspnea/agitation through grimacing, groaning, tachypnea, writhing, rigidity. Effectiveness of pain/tachypnea management is continuously reevaluated hourly to achieve maximal comfort    Overnight events discussed with nursing staff. Comprehensive symptom assessment and justification of GIP level of care as noted. Patient continues to require symptom monitoring through multiple daily bedside assessments and daily intervention through the administration of PRN medications and adjustment of scheduled opiates. See patient's PRN use for the past 24hrs noted below. Extensive time spent discussing care plan with family. No unexpected adverse effects of opiates noted. Plan of care discussed collaboratively with Hospice and Facility staff.    ADVANCE DIRECTIVES:    [x]MOLST: DNR/DNI    ALLERGIES:  Allergy Status Unknown    MEDICATIONS  (STANDING):  glycopyrrolate Injectable 0.4 milliGRAM(s) IV Push four times a day  lactated ringers. 1000 milliLiter(s) (10 mL/Hr) IV Continuous <Continuous>  morphine  - Injectable 4 milliGRAM(s) IV Push every 6 hours  scopolamine 1 mG/72 Hr(s) Patch 1 Patch Transdermal every 72 hours    MEDICATIONS  (PRN):  acetaminophen  Suppository .. 650 milliGRAM(s) Rectal every 6 hours PRN Temp greater or equal to 38C (100.4F), Mild Pain (1 - 3)  bisacodyl Suppository 10 milliGRAM(s) Rectal daily PRN Constipation  haloperidol    Injectable 1 milliGRAM(s) IV Push every 2 hours PRN Agitation  morphine  - Injectable 4 milliGRAM(s) IV Push every 2 hours PRN Moderate pain (4-6), Severe pain (7-10), Respiratory rate greater than 22    Analgesic Use (Scheduled and PRNs) for past 24 hours:  acetaminophen  Suppository ..   650 milliGRAM(s) Rectal (01-24-24 @ 23:52)    morphine  - Injectable   4 milliGRAM(s) IV Push (01-25-24 @ 10:45)   4 milliGRAM(s) IV Push (01-24-24 @ 23:58)   4 milliGRAM(s) IV Push (01-24-24 @ 19:46)   4 milliGRAM(s) IV Push (01-24-24 @ 16:10)  morphine  - Injectable   4 milliGRAM(s) IV Push (01-25-24 @ 05:36)   4 milliGRAM(s) IV Push (01-24-24 @ 21:48)  scopolamine 1 mG/72 Hr(s) Patch   1 Patch Transdermal (01-24-24 @ 16:11)    PRESENT SYMPTOMS/REVIEW OF SYSTEMS:  Source if other than patient:  []Family   [x]Team     Pain: [] yes [x] no - see PAINAD  QOL Impact -   Location -                    Aggravating Factors -  Quality -  Radiation -  Timing -  Severity (0-10 scale) -   Minimal Acceptable Level (0-10 scale) -    PAINAD Score: 3    Dyspnea:                           [x]Mild  []Moderate []Severe  Anxiety:                             []Mild []Moderate []Severe  Fatigue:                             []Mild []Moderate []Severe  Nausea:                             []Mild []Moderate []Severe  Loss of Appetite:              []Mild []Moderate []Severe  Constipation:                    []Mild []Moderate []Severe    Other Symptoms:  []All Other Review Of Systems Negative - [x]Unable to obtain due to poor mentation/encephalopathy    Palliative Performance Status Version 2:  10%    PHYSICAL EXAM:  GENERAL:  [] NAD []Alert [x]Lethargic  []Cachexia  [x]Unarousable  []Verbal  [x]Non-Verbal  Behavioral:   []Anxiety  [x]Delirium []Agitation []Cooperative [x]Oriented x0  HEENT:  [x]Normal  [] Moist Mucous Membranes [x]Dry mouth   []ET Tube/Trach  []Oral lesions  PULMONARY:   []Clear []Tachypnea  []Audible excessive secretions  []Normal Work of Breathing [x]Labored Breathing  [x]Rhonchi []Crackles []Wheezing  CARDIOVASCULAR:    [x]Regular Rate [x]Regular Rhythm []Irregular []Tachy  []Lonny  GASTROINTESTINAL:  [x]Soft  [x]Distended   []+BS  []Non tender []Tender  []PEG []OGT/ NGT  Last BM:  GENITOURINARY:  []Normal [x] Incontinent   []Oliguria/Anuria   []Gandhi  MUSCULOSKELETAL:   []Normal Extremities  [x]Weakness  [x]Bed/Wheelchair bound []Edema  NEUROLOGIC:   []No focal deficits  []Cognitive impairment  [x]Dysphagia []Dysarthria []Paresis [x]Encephalopathic  SKIN:   []Normal   [x]Pressure ulcer(s)  []Rash    Vital Signs Last 24 Hrs  T(C): 36.4 (25 Jan 2024 06:03), Max: 38.1 (24 Jan 2024 23:46)  T(F): 97.5 (25 Jan 2024 06:03), Max: 100.6 (24 Jan 2024 23:46)  HR: 101 (25 Jan 2024 10:45) (92 - 101)  BP: 90/59 (25 Jan 2024 06:03) (82/53 - 90/59)  BP(mean): --  RR: 24 (25 Jan 2024 10:45) (16 - 24)  SpO2: 72% (25 Jan 2024 10:45) (72% - 84%)    Parameters below as of 25 Jan 2024 06:03  Patient On (Oxygen Delivery Method): nasal cannula    LABS/IMAGING: None new    REFERRALS: [x]Hospice [x]Social Work  []Chaplaincy  []Patient/Family Support []Massage Therapy []Music Therapy []Holistic RN    DISCUSSION OF CASE: Sisters - to discuss medication changes, provide emotional support, and expected outcomes; Hospice Liaison - to discuss plan of care and symptom regimen; RN - to discuss symptom burden and regimen adjustment
Hospice GIP Daily Documentation  [facility] / [facility]  Contact Info: Call [facility] at [phone] or [facility] at [phone] for symptom management or to request interdisciplinary team intervention (RN/OLGA, MAE, ) for patient/family    SYMPTOMS REQUIRING GIP LEVEL OF CARE:  []Pain  [x]Dyspnea  []Anxiety/Agitation  []Nausea  []Seizure    HPI/INTERVAL EVENTS: Progressing along the dying process. More hypotensive and hypoxic. Remains unable to participate in interview or tolerate oral medications. Required an additional opiate PRN for dyspnea but breathing remains labored. Medications adjusted as noted below.    Dyspnea managed by hourly monitoring and titration of IV Dilaudid. Dyspnea improved as a result of aggressive titration of IV Dilaudid. GIP to manage uncontrolled pain/dyspnea and continuous titrations of scheduled IV Dilaudid. Requires frequent skilled nursing assessment for non-verbal signs of pain/dyspnea/agitation through grimacing, groaning, tachypnea, writhing, rigidity. Effectiveness of pain/tachypnea management is continuously reevaluated hourly to achieve maximal comfort    Overnight events discussed with nursing staff. Comprehensive symptom assessment and justification of GIP level of care as noted. Patient continues to require symptom monitoring through multiple daily bedside assessments and daily intervention through the administration of PRN medications and adjustment of scheduled opiates/opiate infusion. See patient's PRN use for the past 24hrs noted below. Extensive time spent discussing care plan with family. No unexpected adverse effects of opiates noted. Plan of care discussed collaboratively with Hospice and Facility staff.    ADVANCE DIRECTIVES:    [x]MOLST: DNR/DNI    ALLERGIES:  Allergy Status Unknown    MEDICATIONS  (STANDING):  glycopyrrolate Injectable 0.4 milliGRAM(s) IV Push four times a day  HYDROmorphone  Injectable 1 milliGRAM(s) IV Push every 4 hours  lactated ringers. 1000 milliLiter(s) (10 mL/Hr) IV Continuous <Continuous>  scopolamine 1 mG/72 Hr(s) Patch 1 Patch Transdermal every 72 hours    MEDICATIONS  (PRN):  acetaminophen  Suppository .. 650 milliGRAM(s) Rectal every 6 hours PRN Temp greater or equal to 38C (100.4F), Mild Pain (1 - 3)  bisacodyl Suppository 10 milliGRAM(s) Rectal daily PRN Constipation  haloperidol    Injectable 1 milliGRAM(s) IV Push every 2 hours PRN Agitation  HYDROmorphone  Injectable 1 milliGRAM(s) IV Push every 2 hours PRN Moderate/Severe Pain or RR>22    Analgesic Use (Scheduled and PRNs) for past 24 hours:  acetaminophen   IVPB ..   200 mL/Hr IV Intermittent (01-25-24 @ 22:50)    HYDROmorphone  Injectable   1 milliGRAM(s) IV Push (01-26-24 @ 17:37)   1 milliGRAM(s) IV Push (01-26-24 @ 15:21)    morphine  - Injectable   4 milliGRAM(s) IV Push (01-26-24 @ 10:26)   4 milliGRAM(s) IV Push (01-26-24 @ 05:07)   4 milliGRAM(s) IV Push (01-25-24 @ 22:50)      PRESENT SYMPTOMS/REVIEW OF SYSTEMS:  Source if other than patient:  [x]Family   [x]Team     Pain: [] yes [x] no - see PAINAD  QOL Impact -   Location -                    Aggravating Factors -  Quality -  Radiation -  Timing -  Severity (0-10 scale) -   Minimal Acceptable Level (0-10 scale) -    PAINAD Score: 1    Dyspnea:                           [x]Mild  []Moderate []Severe  Anxiety:                             []Mild []Moderate []Severe  Fatigue:                             []Mild []Moderate []Severe  Nausea:                             []Mild []Moderate []Severe  Loss of Appetite:              []Mild []Moderate []Severe  Constipation:                    []Mild []Moderate []Severe    Other Symptoms:  []All Other Review Of Systems Negative - [x]Unable to obtain due to poor mentation/encephalopathy    Palliative Performance Status Version 2:  10%    PHYSICAL EXAM:  GENERAL:  [] NAD []Alert [x]Lethargic  []Cachexia  [x]Unarousable  []Verbal  [x]Non-Verbal  Behavioral:   []Anxiety  [x]Delirium []Agitation []Cooperative [x]Oriented x0  HEENT:  [x]Normal  [] Moist Mucous Membranes [x]Dry mouth   []ET Tube/Trach  []Oral lesions  PULMONARY:   []Clear []Tachypnea  []Audible excessive secretions  []Normal Work of Breathing [x]Labored Breathing  [x]Rhonchi []Crackles []Wheezing  CARDIOVASCULAR:    [x]Regular Rate [x]Regular Rhythm []Irregular []Tachy  []Lonny  GASTROINTESTINAL:  [x]Soft  [x]Distended   []+BS  []Non tender []Tender  []PEG []OGT/ NGT  Last BM:  GENITOURINARY:  []Normal [x] Incontinent   []Oliguria/Anuria   []Gandhi  MUSCULOSKELETAL:   []Normal Extremities  [x]Weakness  [x]Bed/Wheelchair bound []Edema  NEUROLOGIC:   []No focal deficits  []Cognitive impairment  [x]Dysphagia []Dysarthria []Paresis [x]Encephalopathic  SKIN:   []Normal   [x]Pressure ulcer(s)  []Rash    Vital Signs Last 24 Hrs  T(C): 37.8 (25 Jan 2024 20:40), Max: 37.8 (25 Jan 2024 20:40)  T(F): 100.1 (25 Jan 2024 20:40), Max: 100.1 (25 Jan 2024 20:40)  HR: 98 (25 Jan 2024 20:40) (98 - 98)  BP: 74/45 (25 Jan 2024 20:40) (74/45 - 74/45)  BP(mean): --  RR: 19 (25 Jan 2024 20:40) (19 - 19)  SpO2: 65% (25 Jan 2024 20:40) (65% - 65%)    LABS/IMAGING: None new    REFERRALS: [x]Hospice [x]Social Work  []Chaplaincy  []Patient/Family Support []Massage Therapy []Music Therapy []Holistic RN    DISCUSSION OF CASE: Sisters - to discuss medication changes, provide emotional support, and expected outcomes; Hospice Liaison - to discuss plan of care and symptom regimen; RN - to discuss symptom burden and regimen adjustment

## 2024-01-26 NOTE — PROGRESS NOTE ADULT - PROBLEM SELECTOR PLAN 4
.  FAST 7C+, bedbound with hypoalbuminemia and pressure wounds  -hospice eligible and appropriate
.  FAST 7C+, bedbound with hypoalbuminemia and pressure wounds  -hospice eligible and appropriate

## 2024-01-26 NOTE — PROGRESS NOTE ADULT - PROBLEM SELECTOR PLAN 3
.  In the setting of COVID and PNA  -wean supplemental O2, prioritize medications over uptitration of O2
.  In the setting of COVID and PNA  -wean supplemental O2, prioritize medications over uptitration of O2

## 2024-01-27 NOTE — DISCHARGE NOTE FOR THE EXPIRED PATIENT - HOSPITAL COURSE
DEATH NOTE    Hospital course: 70 yo M with Parkinson's disease, HTN, HLD, depression, pressure ulcers (L hip, medial knees), BPH, renal calculus presented from [facility] for respiratory distress, hypoxia, and hypotension; found to be in severe sepsis acute hypoxic respiratory failure likely iso COVID and superimposed bacterial PNA. Palliative consulted for help with symptom management, converted to inpatient hospice 1/24.     Called to bedside to evaluate the patient. On physical exam, patient did not respond to verbal or noxious stimuli.  No spontaneous respirations.  Absent heart and breath sounds.  Absent radial and carotid pulses.   Pupils are fixed and dilated, no corneal reflex.  EKG rhythm strip shows asystole.   Patient pronounced dead at 0712.  Attending notified.

## 2024-01-30 DIAGNOSIS — R53.2 FUNCTIONAL QUADRIPLEGIA: ICD-10-CM

## 2024-01-30 DIAGNOSIS — N20.0 CALCULUS OF KIDNEY: ICD-10-CM

## 2024-01-30 DIAGNOSIS — Z74.01 BED CONFINEMENT STATUS: ICD-10-CM

## 2024-01-30 DIAGNOSIS — L89.893 PRESSURE ULCER OF OTHER SITE, STAGE 3: ICD-10-CM

## 2024-01-30 DIAGNOSIS — F02.80 DEMENTIA IN OTHER DISEASES CLASSIFIED ELSEWHERE, UNSPECIFIED SEVERITY, WITHOUT BEHAVIORAL DISTURBANCE, PSYCHOTIC DISTURBANCE, MOOD DISTURBANCE, AND ANXIETY: ICD-10-CM

## 2024-01-30 DIAGNOSIS — L89.224 PRESSURE ULCER OF LEFT HIP, STAGE 4: ICD-10-CM

## 2024-01-30 DIAGNOSIS — Z66 DO NOT RESUSCITATE: ICD-10-CM

## 2024-01-30 DIAGNOSIS — N40.0 BENIGN PROSTATIC HYPERPLASIA WITHOUT LOWER URINARY TRACT SYMPTOMS: ICD-10-CM

## 2024-01-30 DIAGNOSIS — F32.A DEPRESSION, UNSPECIFIED: ICD-10-CM

## 2024-01-30 DIAGNOSIS — I10 ESSENTIAL (PRIMARY) HYPERTENSION: ICD-10-CM

## 2024-01-30 DIAGNOSIS — E78.5 HYPERLIPIDEMIA, UNSPECIFIED: ICD-10-CM

## 2024-01-30 DIAGNOSIS — E43 UNSPECIFIED SEVERE PROTEIN-CALORIE MALNUTRITION: ICD-10-CM

## 2024-01-30 DIAGNOSIS — J96.01 ACUTE RESPIRATORY FAILURE WITH HYPOXIA: ICD-10-CM

## 2024-01-30 DIAGNOSIS — Z51.5 ENCOUNTER FOR PALLIATIVE CARE: ICD-10-CM

## 2024-01-30 DIAGNOSIS — R53.83 OTHER FATIGUE: ICD-10-CM

## 2024-01-30 DIAGNOSIS — J18.9 PNEUMONIA, UNSPECIFIED ORGANISM: ICD-10-CM

## 2024-01-30 DIAGNOSIS — U07.1 COVID-19: ICD-10-CM

## 2024-01-30 DIAGNOSIS — E88.09 OTHER DISORDERS OF PLASMA-PROTEIN METABOLISM, NOT ELSEWHERE CLASSIFIED: ICD-10-CM

## 2024-01-30 DIAGNOSIS — G20.A1 PARKINSON'S DISEASE WITHOUT DYSKINESIA, WITHOUT MENTION OF FLUCTUATIONS: ICD-10-CM

## 2024-01-30 LAB
CULTURE RESULTS: ABNORMAL
CULTURE RESULTS: ABNORMAL
ORGANISM # SPEC MICROSCOPIC CNT: ABNORMAL
ORGANISM # SPEC MICROSCOPIC CNT: SIGNIFICANT CHANGE UP
ORGANISM # SPEC MICROSCOPIC CNT: SIGNIFICANT CHANGE UP
SPECIMEN SOURCE: SIGNIFICANT CHANGE UP
SPECIMEN SOURCE: SIGNIFICANT CHANGE UP

## 2024-01-31 DIAGNOSIS — R53.2 FUNCTIONAL QUADRIPLEGIA: ICD-10-CM

## 2024-01-31 DIAGNOSIS — I10 ESSENTIAL (PRIMARY) HYPERTENSION: ICD-10-CM

## 2024-01-31 DIAGNOSIS — A41.89 OTHER SPECIFIED SEPSIS: ICD-10-CM

## 2024-01-31 DIAGNOSIS — L89.152 PRESSURE ULCER OF SACRAL REGION, STAGE 2: ICD-10-CM

## 2024-01-31 DIAGNOSIS — E87.4 MIXED DISORDER OF ACID-BASE BALANCE: ICD-10-CM

## 2024-01-31 DIAGNOSIS — U07.1 COVID-19: ICD-10-CM

## 2024-01-31 DIAGNOSIS — L89.622 PRESSURE ULCER OF LEFT HEEL, STAGE 2: ICD-10-CM

## 2024-01-31 DIAGNOSIS — J80 ACUTE RESPIRATORY DISTRESS SYNDROME: ICD-10-CM

## 2024-01-31 DIAGNOSIS — I95.9 HYPOTENSION, UNSPECIFIED: ICD-10-CM

## 2024-01-31 DIAGNOSIS — Z51.5 ENCOUNTER FOR PALLIATIVE CARE: ICD-10-CM

## 2024-01-31 DIAGNOSIS — R65.20 SEVERE SEPSIS WITHOUT SEPTIC SHOCK: ICD-10-CM

## 2024-01-31 DIAGNOSIS — J18.9 PNEUMONIA, UNSPECIFIED ORGANISM: ICD-10-CM

## 2024-01-31 DIAGNOSIS — G20.A1 PARKINSON'S DISEASE WITHOUT DYSKINESIA, WITHOUT MENTION OF FLUCTUATIONS: ICD-10-CM

## 2024-01-31 DIAGNOSIS — J96.01 ACUTE RESPIRATORY FAILURE WITH HYPOXIA: ICD-10-CM

## 2024-01-31 DIAGNOSIS — L89.324 PRESSURE ULCER OF LEFT BUTTOCK, STAGE 4: ICD-10-CM

## 2024-01-31 DIAGNOSIS — F32.A DEPRESSION, UNSPECIFIED: ICD-10-CM

## 2024-01-31 DIAGNOSIS — N17.9 ACUTE KIDNEY FAILURE, UNSPECIFIED: ICD-10-CM

## 2024-01-31 DIAGNOSIS — E87.0 HYPEROSMOLALITY AND HYPERNATREMIA: ICD-10-CM

## 2024-01-31 DIAGNOSIS — Z66 DO NOT RESUSCITATE: ICD-10-CM

## 2024-01-31 DIAGNOSIS — E78.5 HYPERLIPIDEMIA, UNSPECIFIED: ICD-10-CM
